# Patient Record
Sex: FEMALE | Race: OTHER | HISPANIC OR LATINO
[De-identification: names, ages, dates, MRNs, and addresses within clinical notes are randomized per-mention and may not be internally consistent; named-entity substitution may affect disease eponyms.]

---

## 2018-05-09 ENCOUNTER — APPOINTMENT (OUTPATIENT)
Dept: HEART AND VASCULAR | Facility: CLINIC | Age: 53
End: 2018-05-09

## 2021-06-02 ENCOUNTER — APPOINTMENT (OUTPATIENT)
Dept: HEART AND VASCULAR | Facility: CLINIC | Age: 56
End: 2021-06-02

## 2021-12-14 ENCOUNTER — APPOINTMENT (OUTPATIENT)
Dept: NEUROSURGERY | Facility: CLINIC | Age: 56
End: 2021-12-14
Payer: MEDICAID

## 2021-12-14 VITALS — HEIGHT: 63 IN | BODY MASS INDEX: 33.66 KG/M2 | WEIGHT: 190 LBS

## 2021-12-14 PROCEDURE — 99204 OFFICE O/P NEW MOD 45 MIN: CPT

## 2022-01-06 ENCOUNTER — NON-APPOINTMENT (OUTPATIENT)
Age: 57
End: 2022-01-06

## 2022-01-14 ENCOUNTER — APPOINTMENT (OUTPATIENT)
Dept: NEUROSURGERY | Facility: HOSPITAL | Age: 57
End: 2022-01-14
Payer: MEDICAID

## 2022-01-14 PROCEDURE — 63685 INS/RPLC SPI NPG/RCVR POCKET: CPT | Mod: RT

## 2022-01-14 PROCEDURE — 63650 IMPLANT NEUROELECTRODES: CPT

## 2022-01-14 PROCEDURE — 63685 INS/RPLC SPI NPG/RCVR POCKET: CPT | Mod: AS,RT

## 2022-01-18 ENCOUNTER — NON-APPOINTMENT (OUTPATIENT)
Age: 57
End: 2022-01-18

## 2022-01-25 ENCOUNTER — APPOINTMENT (OUTPATIENT)
Dept: NEUROSURGERY | Facility: CLINIC | Age: 57
End: 2022-01-25
Payer: MEDICAID

## 2022-01-25 VITALS — WEIGHT: 190 LBS | HEIGHT: 63 IN | BODY MASS INDEX: 33.66 KG/M2

## 2022-01-25 PROCEDURE — 99213 OFFICE O/P EST LOW 20 MIN: CPT

## 2022-02-03 ENCOUNTER — NON-APPOINTMENT (OUTPATIENT)
Age: 57
End: 2022-02-03

## 2022-02-08 ENCOUNTER — APPOINTMENT (OUTPATIENT)
Dept: NEUROSURGERY | Facility: CLINIC | Age: 57
End: 2022-02-08
Payer: MEDICAID

## 2022-02-08 ENCOUNTER — NON-APPOINTMENT (OUTPATIENT)
Age: 57
End: 2022-02-08

## 2022-02-08 VITALS — WEIGHT: 190 LBS | HEIGHT: 63 IN | BODY MASS INDEX: 33.66 KG/M2 | RESPIRATION RATE: 16 BRPM

## 2022-02-08 PROCEDURE — 99213 OFFICE O/P EST LOW 20 MIN: CPT

## 2022-02-22 ENCOUNTER — APPOINTMENT (OUTPATIENT)
Dept: NEUROSURGERY | Facility: CLINIC | Age: 57
End: 2022-02-22
Payer: MEDICAID

## 2022-02-22 DIAGNOSIS — N39.3 STRESS INCONTINENCE (FEMALE) (MALE): ICD-10-CM

## 2022-02-22 PROCEDURE — 99213 OFFICE O/P EST LOW 20 MIN: CPT

## 2022-03-01 ENCOUNTER — APPOINTMENT (OUTPATIENT)
Dept: NEUROSURGERY | Facility: CLINIC | Age: 57
End: 2022-03-01

## 2022-03-01 DIAGNOSIS — M79.89 OTHER SPECIFIED SOFT TISSUE DISORDERS: ICD-10-CM

## 2022-03-22 ENCOUNTER — LABORATORY RESULT (OUTPATIENT)
Age: 57
End: 2022-03-22

## 2022-03-22 ENCOUNTER — APPOINTMENT (OUTPATIENT)
Dept: NEUROSURGERY | Facility: CLINIC | Age: 57
End: 2022-03-22
Payer: MEDICAID

## 2022-03-22 VITALS — BODY MASS INDEX: 33.66 KG/M2 | HEIGHT: 63 IN | WEIGHT: 190 LBS | RESPIRATION RATE: 16 BRPM

## 2022-03-22 PROCEDURE — 99213 OFFICE O/P EST LOW 20 MIN: CPT

## 2022-03-24 LAB — CRP SERPL-MCNC: 14 MG/L

## 2022-03-31 ENCOUNTER — NON-APPOINTMENT (OUTPATIENT)
Age: 57
End: 2022-03-31

## 2022-04-05 ENCOUNTER — LABORATORY RESULT (OUTPATIENT)
Age: 57
End: 2022-04-05

## 2022-04-05 ENCOUNTER — APPOINTMENT (OUTPATIENT)
Dept: NEUROSURGERY | Facility: CLINIC | Age: 57
End: 2022-04-05
Payer: MEDICAID

## 2022-04-05 VITALS — HEIGHT: 63 IN | BODY MASS INDEX: 33.66 KG/M2 | RESPIRATION RATE: 16 BRPM | WEIGHT: 190 LBS

## 2022-04-05 PROCEDURE — 99212 OFFICE O/P EST SF 10 MIN: CPT

## 2022-04-11 LAB — CRP SERPL-MCNC: 25 MG/L

## 2022-04-20 ENCOUNTER — OUTPATIENT (OUTPATIENT)
Dept: OUTPATIENT SERVICES | Facility: HOSPITAL | Age: 57
LOS: 1 days | Discharge: HOME | End: 2022-04-20
Payer: MEDICAID

## 2022-04-20 ENCOUNTER — APPOINTMENT (OUTPATIENT)
Dept: HEART AND VASCULAR | Facility: CLINIC | Age: 57
End: 2022-04-20

## 2022-04-20 VITALS
OXYGEN SATURATION: 100 % | HEART RATE: 69 BPM | HEIGHT: 63 IN | DIASTOLIC BLOOD PRESSURE: 68 MMHG | WEIGHT: 214.95 LBS | SYSTOLIC BLOOD PRESSURE: 122 MMHG | RESPIRATION RATE: 20 BRPM | TEMPERATURE: 97 F

## 2022-04-20 DIAGNOSIS — E89.0 POSTPROCEDURAL HYPOTHYROIDISM: Chronic | ICD-10-CM

## 2022-04-20 DIAGNOSIS — Z98.890 OTHER SPECIFIED POSTPROCEDURAL STATES: Chronic | ICD-10-CM

## 2022-04-20 DIAGNOSIS — Z98.1 ARTHRODESIS STATUS: Chronic | ICD-10-CM

## 2022-04-20 DIAGNOSIS — Z01.818 ENCOUNTER FOR OTHER PREPROCEDURAL EXAMINATION: ICD-10-CM

## 2022-04-20 LAB
ALBUMIN SERPL ELPH-MCNC: 4.8 G/DL — SIGNIFICANT CHANGE UP (ref 3.5–5.2)
ALP SERPL-CCNC: 83 U/L — SIGNIFICANT CHANGE UP (ref 30–115)
ALT FLD-CCNC: 12 U/L — SIGNIFICANT CHANGE UP (ref 0–41)
ANION GAP SERPL CALC-SCNC: 15 MMOL/L — HIGH (ref 7–14)
APTT BLD: 40.8 SEC — HIGH (ref 27–39.2)
AST SERPL-CCNC: 17 U/L — SIGNIFICANT CHANGE UP (ref 0–41)
BASOPHILS # BLD AUTO: 0.03 K/UL — SIGNIFICANT CHANGE UP (ref 0–0.2)
BASOPHILS NFR BLD AUTO: 0.4 % — SIGNIFICANT CHANGE UP (ref 0–1)
BILIRUB SERPL-MCNC: 0.4 MG/DL — SIGNIFICANT CHANGE UP (ref 0.2–1.2)
BUN SERPL-MCNC: 15 MG/DL — SIGNIFICANT CHANGE UP (ref 10–20)
CALCIUM SERPL-MCNC: 8.7 MG/DL — SIGNIFICANT CHANGE UP (ref 8.5–10.1)
CHLORIDE SERPL-SCNC: 96 MMOL/L — LOW (ref 98–110)
CO2 SERPL-SCNC: 27 MMOL/L — SIGNIFICANT CHANGE UP (ref 17–32)
CREAT SERPL-MCNC: 1 MG/DL — SIGNIFICANT CHANGE UP (ref 0.7–1.5)
EGFR: 67 ML/MIN/1.73M2 — SIGNIFICANT CHANGE UP
EOSINOPHIL # BLD AUTO: 0.09 K/UL — SIGNIFICANT CHANGE UP (ref 0–0.7)
EOSINOPHIL NFR BLD AUTO: 1.3 % — SIGNIFICANT CHANGE UP (ref 0–8)
GLUCOSE SERPL-MCNC: 77 MG/DL — SIGNIFICANT CHANGE UP (ref 70–99)
HCT VFR BLD CALC: 35 % — LOW (ref 37–47)
HGB BLD-MCNC: 11.3 G/DL — LOW (ref 12–16)
IMM GRANULOCYTES NFR BLD AUTO: 0.4 % — HIGH (ref 0.1–0.3)
INR BLD: 1.1 RATIO — SIGNIFICANT CHANGE UP (ref 0.65–1.3)
LYMPHOCYTES # BLD AUTO: 1.48 K/UL — SIGNIFICANT CHANGE UP (ref 1.2–3.4)
LYMPHOCYTES # BLD AUTO: 22 % — SIGNIFICANT CHANGE UP (ref 20.5–51.1)
MCHC RBC-ENTMCNC: 30.1 PG — SIGNIFICANT CHANGE UP (ref 27–31)
MCHC RBC-ENTMCNC: 32.3 G/DL — SIGNIFICANT CHANGE UP (ref 32–37)
MCV RBC AUTO: 93.1 FL — SIGNIFICANT CHANGE UP (ref 81–99)
MONOCYTES # BLD AUTO: 0.54 K/UL — SIGNIFICANT CHANGE UP (ref 0.1–0.6)
MONOCYTES NFR BLD AUTO: 8 % — SIGNIFICANT CHANGE UP (ref 1.7–9.3)
NEUTROPHILS # BLD AUTO: 4.55 K/UL — SIGNIFICANT CHANGE UP (ref 1.4–6.5)
NEUTROPHILS NFR BLD AUTO: 67.9 % — SIGNIFICANT CHANGE UP (ref 42.2–75.2)
NRBC # BLD: 0 /100 WBCS — SIGNIFICANT CHANGE UP (ref 0–0)
PLATELET # BLD AUTO: 389 K/UL — SIGNIFICANT CHANGE UP (ref 130–400)
POTASSIUM SERPL-MCNC: 4.5 MMOL/L — SIGNIFICANT CHANGE UP (ref 3.5–5)
POTASSIUM SERPL-SCNC: 4.5 MMOL/L — SIGNIFICANT CHANGE UP (ref 3.5–5)
PROT SERPL-MCNC: 7.4 G/DL — SIGNIFICANT CHANGE UP (ref 6–8)
PROTHROM AB SERPL-ACNC: 12.6 SEC — SIGNIFICANT CHANGE UP (ref 9.95–12.87)
RBC # BLD: 3.76 M/UL — LOW (ref 4.2–5.4)
RBC # FLD: 13.4 % — SIGNIFICANT CHANGE UP (ref 11.5–14.5)
SODIUM SERPL-SCNC: 138 MMOL/L — SIGNIFICANT CHANGE UP (ref 135–146)
WBC # BLD: 6.72 K/UL — SIGNIFICANT CHANGE UP (ref 4.8–10.8)
WBC # FLD AUTO: 6.72 K/UL — SIGNIFICANT CHANGE UP (ref 4.8–10.8)

## 2022-04-20 PROCEDURE — 93010 ELECTROCARDIOGRAM REPORT: CPT

## 2022-04-20 RX ORDER — FLUTICASONE PROPIONATE AND SALMETEROL 50; 250 UG/1; UG/1
1 POWDER ORAL; RESPIRATORY (INHALATION)
Qty: 0 | Refills: 0 | DISCHARGE

## 2022-04-20 RX ORDER — VENLAFAXINE HCL 75 MG
1 CAPSULE, EXT RELEASE 24 HR ORAL
Qty: 0 | Refills: 0 | DISCHARGE

## 2022-04-20 NOTE — H&P PST ADULT - HISTORY OF PRESENT ILLNESS
57 yo f here for past. pt sched for MRI L&T spine with & w/o sedation     Pt reports no cardiopulmonary issues denies sob/diez/cp/palpitations. Pt states no recent infections no fever no cough no uti uri. Stated exercise tolerance is   2  flights no changes. Dwight screen revd.  pt verbalized and understanding of instructions  given and all concerns and questions asked and answered.  Pt denies any s/s covid 19 and reports no contact with known positive people. Pt has appointment for repeat covid testing pre op and instructed to continue to self monitor and report any concerns to MD. Pt will continue to practice self isolation and  exposure control measures pre op  Anesthesia Alert  NO--Difficult Airway  NO--History of neck surgery or radiation  NO--Limited ROM of neck  NO--History of Malignant hyperthermia  NO--No personal or family history of Pseudocholinesterase deficiency.  NO--Prior Anesthesia Complication  NO--Latex Allergy  NO--Loose teeth  NO--History of Rheumatoid Arthritis  NO--DWIGHT  NO--Other_____  written and verbal instructions with teach back on chlorhexidine shampoo provided,  pt verbalized understanding with returned demonstration  PT DENIES ANY RASHES, ABRASION, OR OPEN WOUNDS OR CUTS  denies travel outside the [place] in the past 30 days

## 2022-04-20 NOTE — H&P PST ADULT - NSICDXPASTSURGICALHX_GEN_ALL_CORE_FT
PAST SURGICAL HISTORY:  H/O laminectomy     S/P discectomy     S/P spinal fusion     S/P total thyroidectomy

## 2022-04-20 NOTE — H&P PST ADULT - NSICDXPASTMEDICALHX_GEN_ALL_CORE_FT
PAST MEDICAL HISTORY:  Anxiety     Anxiety and depression     Asthma     Chronic low back pain with bilateral sciatica     Deviated septum     Diabetes mellitus     H/O carpal tunnel repair     H/O radiculopathy     High cholesterol     History of IBS     HTN (hypertension)     Hypocalcemia     Hypothyroid     Migraine     Spinal cord stimulator status     Stress incontinence     Thyroid cancer

## 2022-04-22 DIAGNOSIS — M54.50 LOW BACK PAIN, UNSPECIFIED: ICD-10-CM

## 2022-04-25 ENCOUNTER — LABORATORY RESULT (OUTPATIENT)
Age: 57
End: 2022-04-25

## 2022-04-27 ENCOUNTER — RESULT REVIEW (OUTPATIENT)
Age: 57
End: 2022-04-27

## 2022-04-27 ENCOUNTER — OUTPATIENT (OUTPATIENT)
Dept: OUTPATIENT SERVICES | Facility: HOSPITAL | Age: 57
LOS: 1 days | Discharge: HOME | End: 2022-04-27
Payer: MEDICAID

## 2022-04-27 DIAGNOSIS — Z98.890 OTHER SPECIFIED POSTPROCEDURAL STATES: Chronic | ICD-10-CM

## 2022-04-27 DIAGNOSIS — M54.42 LUMBAGO WITH SCIATICA, LEFT SIDE: ICD-10-CM

## 2022-04-27 DIAGNOSIS — E89.0 POSTPROCEDURAL HYPOTHYROIDISM: Chronic | ICD-10-CM

## 2022-04-27 DIAGNOSIS — Z98.1 ARTHRODESIS STATUS: Chronic | ICD-10-CM

## 2022-04-27 DIAGNOSIS — Z96.89 PRESENCE OF OTHER SPECIFIED FUNCTIONAL IMPLANTS: ICD-10-CM

## 2022-04-27 LAB — GLUCOSE BLDC GLUCOMTR-MCNC: 87 MG/DL — SIGNIFICANT CHANGE UP (ref 70–99)

## 2022-04-27 PROCEDURE — 72157 MRI CHEST SPINE W/O & W/DYE: CPT | Mod: 26

## 2022-04-27 PROCEDURE — 72158 MRI LUMBAR SPINE W/O & W/DYE: CPT | Mod: 26

## 2022-04-29 PROBLEM — J34.2 DEVIATED NASAL SEPTUM: Chronic | Status: ACTIVE | Noted: 2022-04-20

## 2022-04-29 PROBLEM — E03.9 HYPOTHYROIDISM, UNSPECIFIED: Chronic | Status: ACTIVE | Noted: 2022-04-20

## 2022-04-29 PROBLEM — E78.00 PURE HYPERCHOLESTEROLEMIA, UNSPECIFIED: Chronic | Status: ACTIVE | Noted: 2022-04-20

## 2022-04-29 PROBLEM — C73 MALIGNANT NEOPLASM OF THYROID GLAND: Chronic | Status: ACTIVE | Noted: 2022-04-20

## 2022-04-29 PROBLEM — Z96.89 PRESENCE OF OTHER SPECIFIED FUNCTIONAL IMPLANTS: Chronic | Status: ACTIVE | Noted: 2022-04-20

## 2022-04-29 PROBLEM — Z86.69 PERSONAL HISTORY OF OTHER DISEASES OF THE NERVOUS SYSTEM AND SENSE ORGANS: Chronic | Status: ACTIVE | Noted: 2022-04-20

## 2022-04-29 PROBLEM — N39.3 STRESS INCONTINENCE (FEMALE) (MALE): Chronic | Status: ACTIVE | Noted: 2022-04-20

## 2022-04-29 PROBLEM — Z98.890 OTHER SPECIFIED POSTPROCEDURAL STATES: Chronic | Status: ACTIVE | Noted: 2022-04-20

## 2022-04-29 PROBLEM — M54.42 LUMBAGO WITH SCIATICA, LEFT SIDE: Chronic | Status: ACTIVE | Noted: 2022-04-20

## 2022-04-29 PROBLEM — F41.9 ANXIETY DISORDER, UNSPECIFIED: Chronic | Status: ACTIVE | Noted: 2022-04-20

## 2022-04-29 PROBLEM — J45.909 UNSPECIFIED ASTHMA, UNCOMPLICATED: Chronic | Status: ACTIVE | Noted: 2022-04-20

## 2022-04-29 PROBLEM — I10 ESSENTIAL (PRIMARY) HYPERTENSION: Chronic | Status: ACTIVE | Noted: 2022-04-20

## 2022-04-29 PROBLEM — E83.51 HYPOCALCEMIA: Chronic | Status: ACTIVE | Noted: 2022-04-20

## 2022-04-29 PROBLEM — Z87.19 PERSONAL HISTORY OF OTHER DISEASES OF THE DIGESTIVE SYSTEM: Chronic | Status: ACTIVE | Noted: 2022-04-20

## 2022-04-29 PROBLEM — G43.909 MIGRAINE, UNSPECIFIED, NOT INTRACTABLE, WITHOUT STATUS MIGRAINOSUS: Chronic | Status: ACTIVE | Noted: 2022-04-20

## 2022-05-03 ENCOUNTER — APPOINTMENT (OUTPATIENT)
Dept: NEUROSURGERY | Facility: CLINIC | Age: 57
End: 2022-05-03
Payer: MEDICAID

## 2022-05-03 DIAGNOSIS — R93.7 ABNORMAL FINDINGS ON DIAGNOSTIC IMAGING OF OTHER PARTS OF MUSCULOSKELETAL SYSTEM: ICD-10-CM

## 2022-05-03 PROCEDURE — 99441: CPT

## 2022-05-05 PROBLEM — R93.7 ABNORMAL MRI, SPINE: Status: ACTIVE | Noted: 2022-05-03

## 2022-05-09 NOTE — REASON FOR VISIT
[Home] : at home, [unfilled] , at the time of the visit. [Medical Office: ([facility])___] : at the medical office located in  [Verbal consent obtained from patient] : the patient, [unfilled] [Follow-Up: _____] : a [unfilled] follow-up visit [FreeTextEntry1] : 55 yo female following up to review recent MRIs with contrast. \par \par Discussed results of MRI thoracic and lumbar and informed patient that there is no evidence of infection present, and the SCS remains in appropriate location. Also discussed incidental findings in the MRI reports\par \par IMAGING: MRI Thoracic wwo dated 4/27/22 demonstrates cystic lesion within the RIGHT hepatic lobe measuring 1.8 cm. There is also a 1.4 cm T1 and T2 hypointense, STIR faintly hypertense lesion within the T7 vertebral body, and not present on the 2009 MRI. \par \par Plan: \par   - Patient may return to work in 2 weeks, on 5/16/2022.\par      A new work note will be sent to patient via email. \par   - Results of MRI will be emailed to patient, and advised to follow-up with PMD with regard to her liver findings.\par   - Bone Scan order emailed to patient, does not require sedation and may be done in the meantime. \par   - FU after bone scan\par \par \par \par

## 2022-05-09 NOTE — REVIEW OF SYSTEMS
[As Noted in HPI] : as noted in HPI [Fever] : no fever [Chills] : no chills [Shortness Of Breath] : no shortness of breath [Wheezing] : no wheezing [Vomiting] : no vomiting

## 2022-05-14 ENCOUNTER — APPOINTMENT (OUTPATIENT)
Dept: NUCLEAR MEDICINE | Facility: HOSPITAL | Age: 57
End: 2022-05-14

## 2022-05-17 ENCOUNTER — NON-APPOINTMENT (OUTPATIENT)
Age: 57
End: 2022-05-17

## 2022-05-17 ENCOUNTER — APPOINTMENT (OUTPATIENT)
Dept: NEUROSURGERY | Facility: CLINIC | Age: 57
End: 2022-05-17

## 2022-05-26 NOTE — H&P PST ADULT - NS MD HP HEP C STATUS
Pt called office, notified pt Dr. Theodore Ryder did say patient could try medication, but if injection site reaction occurs, will need to discontinue immediately. Explained to Pt signs of site reaction or more severe signs/symptoms and instructed to keep our office updated if she experiences it. Pt voiced understanding and appreciative. Negative

## 2022-05-31 ENCOUNTER — RESULT REVIEW (OUTPATIENT)
Age: 57
End: 2022-05-31

## 2022-06-02 ENCOUNTER — APPOINTMENT (OUTPATIENT)
Dept: NUCLEAR MEDICINE | Facility: HOSPITAL | Age: 57
End: 2022-06-02

## 2022-06-02 ENCOUNTER — OUTPATIENT (OUTPATIENT)
Dept: OUTPATIENT SERVICES | Facility: HOSPITAL | Age: 57
LOS: 1 days | End: 2022-06-02
Payer: MEDICAID

## 2022-06-02 DIAGNOSIS — E89.0 POSTPROCEDURAL HYPOTHYROIDISM: Chronic | ICD-10-CM

## 2022-06-02 DIAGNOSIS — Z98.890 OTHER SPECIFIED POSTPROCEDURAL STATES: Chronic | ICD-10-CM

## 2022-06-02 DIAGNOSIS — Z98.1 ARTHRODESIS STATUS: Chronic | ICD-10-CM

## 2022-06-02 PROCEDURE — 78830 RP LOCLZJ TUM SPECT W/CT 1: CPT | Mod: 26

## 2022-06-02 PROCEDURE — 78830 RP LOCLZJ TUM SPECT W/CT 1: CPT

## 2022-06-03 ENCOUNTER — NON-APPOINTMENT (OUTPATIENT)
Age: 57
End: 2022-06-03

## 2022-06-14 ENCOUNTER — APPOINTMENT (OUTPATIENT)
Dept: NEUROSURGERY | Facility: CLINIC | Age: 57
End: 2022-06-14

## 2022-06-14 DIAGNOSIS — M54.6 PAIN IN THORACIC SPINE: ICD-10-CM

## 2022-06-14 PROCEDURE — 99214 OFFICE O/P EST MOD 30 MIN: CPT

## 2022-06-15 ENCOUNTER — NON-APPOINTMENT (OUTPATIENT)
Age: 57
End: 2022-06-15

## 2022-06-16 ENCOUNTER — RESULT REVIEW (OUTPATIENT)
Age: 57
End: 2022-06-16

## 2022-06-16 ENCOUNTER — OUTPATIENT (OUTPATIENT)
Dept: OUTPATIENT SERVICES | Facility: HOSPITAL | Age: 57
LOS: 1 days | End: 2022-06-16
Payer: MEDICAID

## 2022-06-16 ENCOUNTER — NON-APPOINTMENT (OUTPATIENT)
Age: 57
End: 2022-06-16

## 2022-06-16 ENCOUNTER — APPOINTMENT (OUTPATIENT)
Dept: HEART AND VASCULAR | Facility: CLINIC | Age: 57
End: 2022-06-16
Payer: MEDICAID

## 2022-06-16 ENCOUNTER — LABORATORY RESULT (OUTPATIENT)
Age: 57
End: 2022-06-16

## 2022-06-16 ENCOUNTER — APPOINTMENT (OUTPATIENT)
Dept: ORTHOPEDIC SURGERY | Facility: CLINIC | Age: 57
End: 2022-06-16
Payer: MEDICAID

## 2022-06-16 VITALS — BODY MASS INDEX: 37.56 KG/M2 | WEIGHT: 212 LBS | HEIGHT: 63 IN

## 2022-06-16 VITALS
HEIGHT: 63 IN | HEART RATE: 73 BPM | TEMPERATURE: 97.8 F | WEIGHT: 212 LBS | BODY MASS INDEX: 37.56 KG/M2 | OXYGEN SATURATION: 98 % | DIASTOLIC BLOOD PRESSURE: 80 MMHG | SYSTOLIC BLOOD PRESSURE: 122 MMHG

## 2022-06-16 DIAGNOSIS — E78.5 HYPERLIPIDEMIA, UNSPECIFIED: ICD-10-CM

## 2022-06-16 DIAGNOSIS — E89.0 POSTPROCEDURAL HYPOTHYROIDISM: Chronic | ICD-10-CM

## 2022-06-16 DIAGNOSIS — Z98.1 ARTHRODESIS STATUS: Chronic | ICD-10-CM

## 2022-06-16 DIAGNOSIS — Z98.890 OTHER SPECIFIED POSTPROCEDURAL STATES: Chronic | ICD-10-CM

## 2022-06-16 DIAGNOSIS — R00.2 PALPITATIONS: ICD-10-CM

## 2022-06-16 DIAGNOSIS — M25.461 EFFUSION, RIGHT KNEE: ICD-10-CM

## 2022-06-16 PROCEDURE — 99214 OFFICE O/P EST MOD 30 MIN: CPT | Mod: 25

## 2022-06-16 PROCEDURE — 99204 OFFICE O/P NEW MOD 45 MIN: CPT | Mod: 25

## 2022-06-16 PROCEDURE — 20611 DRAIN/INJ JOINT/BURSA W/US: CPT

## 2022-06-16 PROCEDURE — 73564 X-RAY EXAM KNEE 4 OR MORE: CPT

## 2022-06-16 PROCEDURE — 93000 ELECTROCARDIOGRAM COMPLETE: CPT

## 2022-06-16 PROCEDURE — 73564 X-RAY EXAM KNEE 4 OR MORE: CPT | Mod: 26,LT,RT

## 2022-06-16 RX ORDER — OMEPRAZOLE 40 MG/1
40 CAPSULE, DELAYED RELEASE ORAL
Qty: 30 | Refills: 0 | Status: ACTIVE | COMMUNITY
Start: 2022-05-24

## 2022-06-16 RX ORDER — ALBUTEROL SULFATE 90 UG/1
108 (90 BASE) INHALANT RESPIRATORY (INHALATION)
Refills: 0 | Status: ACTIVE | COMMUNITY
Start: 2022-06-16

## 2022-06-16 RX ORDER — GABAPENTIN 300 MG/1
300 CAPSULE ORAL
Qty: 30 | Refills: 0 | Status: ACTIVE | COMMUNITY
Start: 2022-05-24

## 2022-06-16 RX ORDER — METOPROLOL SUCCINATE 25 MG/1
25 TABLET, EXTENDED RELEASE ORAL DAILY
Qty: 90 | Refills: 3 | Status: ACTIVE | COMMUNITY
Start: 2022-06-16 | End: 1900-01-01

## 2022-06-16 RX ORDER — ONDANSETRON 8 MG/1
8 TABLET ORAL
Qty: 12 | Refills: 0 | Status: ACTIVE | COMMUNITY
Start: 2022-05-31

## 2022-06-16 RX ORDER — KETOCONAZOLE 20 MG/G
2 CREAM TOPICAL
Qty: 60 | Refills: 0 | Status: ACTIVE | COMMUNITY
Start: 2022-06-12

## 2022-06-16 RX ORDER — MECLIZINE HYDROCHLORIDE 25 MG/1
25 TABLET ORAL
Qty: 20 | Refills: 0 | Status: ACTIVE | COMMUNITY
Start: 2022-05-24

## 2022-06-16 RX ORDER — ALBUTEROL SULFATE 90 UG/1
108 (90 BASE) INHALANT RESPIRATORY (INHALATION)
Qty: 18 | Refills: 0 | Status: ACTIVE | COMMUNITY
Start: 2022-05-25

## 2022-06-16 NOTE — ASSESSMENT
[FreeTextEntry1] : Hyperlipidemia: \par - Continue pravastatin 40mg daily\par - Continue Ezetimibe 10mg daily\par - Discussed therapeutic lifestyle changes to promote improved lipid metabolism (low fat, low cholesterol heart healthy diet, striving for optimal weight control, and aerobic exercises as tolerated)\par - Will consider increasing dose if CAD depending on study results\par - Obtain labs next visit\par \par Palpitations: +SOB, + LE swelling vs thyroid dz\par - Obtain echo to evaluate LVF and sructural dz\par - Restart Toprol XL 25mg daily\par \par Obesity (BMI 37.5):\par - We had an extensive discussion about the therapeutic lifestyle changes to promote cardiovascular fitness and achieving goal weight.\par \par Hypothyroid: \par - Synthroid 300mcg daily\par \par Anxiety: \par - Continue Abilify 5 mg daily \par - Continue Clonazepam 1mg\par

## 2022-06-16 NOTE — HISTORY OF PRESENT ILLNESS
[FreeTextEntry1] : Vibha Gamino is a 57 yo woman with a pmhx of hypothyroidism, anxiety, diet controlled DM, HLD, and palpitations. She has spine issues dating back to 2015 and s/p numerous surgeries. January 2022 she had SCS placement with good results but she fell approx. 2 weeks post procedure. Since then she states she has swelling and pain to the RLE. She also remarks on having right Baker's cyst. She is here today with c/o intermittent palpitations, SOB and LE swelling. She has gained weight since April (BMI 37.5) and remarks on being sedentary. She started "medical medium diet" to help with weight loss and sugar intake.\par \par

## 2022-06-16 NOTE — REVIEW OF SYSTEMS
[Negative] : Heme/Lymph [SOB] : shortness of breath [Lower Ext Edema] : lower extremity edema [Joint Pain] : joint pain [Joint Swelling] : joint swelling [Palpitations] : palpitations [Weight Loss (___ Lbs)] : no recent weight loss

## 2022-06-16 NOTE — REASON FOR VISIT
[Other: ____] : [unfilled] [FreeTextEntry1] : EKG:\par 06/16/2022: Sinus rhythm, T wave abnormality V3-V6 (unchanged from prior)\par 04/22/2022: Sinus Rhythm, TW abnormality, lateral ischemia\par -------------------------------------------------\par ECHO:\par 02/18/2016: EF 55%. Nml LV size and function. Trace TR and trace AI.\par -------------------------------------------------\par Stress: \par 03/16/2016:  EF 60%. Exc 3:31 min, 5 METS. No ischemia,no WMA, exercised-induced PH (PAS 45mmHg).\par -------------------------------------------------\par Cath: \par 2012 ([facility]): normal diagnostic LHC\par ------------------------------------------------\par Carotids:\par 02/18/2016: Minimal diffuse plaque noted in JENNIFER.\par -------------------------------------------------\par MIRIAM:\par 2/18/16: normal\par -------------------------------------------------\par Holter:\par 2015: PVC's

## 2022-06-16 NOTE — PHYSICAL EXAM
[de-identified] : General: Well-nourished, well-developed, alert, and in no acute distress.\par Head: Normocephalic.\par Eyes: Pupils equal round reactive to light and accommodation, extraocular muscles intact, normal sclera.\par Nose: No nasal discharge.\par Cardiac: Regular rate. Extremities are warm and well perfused. Distal pulses are symmetric bilaterally.\par Respiratory: No labored breathing.\par Extremities: Sensation is intact distally bilaterally.  Distal pulses are symmetric bilaterally\par Lymphatic: No regional lymphadenopathy, no lymphedema\par Neurologic: No focal deficits\par Skin: Normal skin color, texture, and turgor\par Psychiatric: Normal affect\par MSK: as noted above/below\par \par \par \par RIGHT KNEE:\par \par Inspection: no bruising, erythema\par Joint Effusion:MODERATE\par ROM: Knee Flexion 30 , Knee Extension +5\par Palpation:MEDIAL JOINT LINE PAIN, PERIPATELLAR PAIN, No pain at patellar tendon, MFC/LFC, Medial/Lateral Tibial Plateau\par Leg Length Discrepancy:no\par Patella: no apprehension, +COMPRESSION\par Distal Pulses: normal\par Lower Extremity Strength:4+/5\par Lower Extremity Reflexes:normal, 2+\par Lower Extremity Sensation: normal\par \par Special Tests:\par Tanja: POSITIVE MEDIALLY\par Varus/Valgus:Negative, no instability\par \par LEFT KNEE:\par \par Inspection: no bruising, swelling, erythema\par Joint Effusion:no \par ROM: Knee Flexion 120 , Knee Extension 0\par Palpation: No pain at patellar tendon, MFC/LFC, Medial/Lateral Tibial Plateau\par Leg Length Discrepancy:no\par Patella: no apprehension\par Distal Pulses: normal\par Lower Extremity Strength:normal, 5/5 \par Lower Extremity Reflexes:normal, 2+\par Lower Extremity Sensation: normal\par \par  [de-identified] : Xray Bilateral Knees - Multiple views were reviewed with the patient in detail.  There is no acute fracture or dislocation.  There is right knee effusion.  Trace bilateral patellofemoral space narrowing/degenerative change. We will await formal radiology reading.\par \par

## 2022-06-16 NOTE — DISCUSSION/SUMMARY
[Medication Risks Reviewed] : Medication risks reviewed [de-identified] : The patient is a 56 year old woman with history of chronic back pain s/p spinal cord stimulator presenting with atraumatic, severe right knee pain.  Cannot rule out displaced meniscus tear, insufficiency fracture.\par \par Imaging/Diagnostics/Medical Records were interpreted and/or reviewed and results were reviewed with the patient in detail.  All questions were answered appropriately.\par \par MRI of the right knee ordered today.\par \par Patient has exhausted conservative treatment including relative rest, activity modification, at least 8 weeks of physician-directed home exercise program/physical therapy, pharmacologics, observation.  IT IS MEDICALLY NECESSARY TO APPROVE THE FOLLOWING DIAGNOSTIC IMAGING.\par \par Patient was prescribed a short course of Etodolac .Appropriate use of medication was reviewed with the patient in detail. Risks, benefits, and adverse effects medication were discussed.\par \par Ok to continue brace for offloading.\par \par After informed consent, and explanation of risks, benefits, alternatives, adverse effects of injection, which includes but is not limited to infection, bleeding, allergic reaction, swelling, soft tissue weakening/tendon rupture, neurovascular injury, injection site complication, fat atrophy, skin depigmentation, failure to improve symptoms, the patient would like to proceed with the procedure - RIGHT KNEE ULTRASOUND-GUIDED ARTHROCENTESIS. See procedure note above. Patient tolerated the procedure well. The patient was provided with postinjection instructions.\par \par Follow-up synovial fluid analysis.\par \par Follow-up after MRI.\par \par I explained to the patient that I will be leaving at the end of July 2022.  With established patients, I will continue to provide care during that time.  I explained that the Orthopedic team will be sending out letters regarding my departure and we have provided referrals in-office as requested.\par \par >45 minutes of time was spent on total encounter.  >50% of the visit was spent on face-to-face counseling/coordination of care and medical-decision making for this patient.\par \par \par

## 2022-06-16 NOTE — DISCUSSION/SUMMARY
[FreeTextEntry1] : All relevant risks and benefits discussed. Patient verbalizes understanding and agreement with plan.\par \par

## 2022-06-16 NOTE — PROCEDURE
[de-identified] : Ultrasound-Guided RIGHT Knee Arthrocentesis\par \par Indication for U/S Guidance: Ensure placement within the tibiofemoral joint for diagnostic purposes, while avoiding neurovascular structures\par \par Indication for Injection: KNEE EFFUSION\par \par A discussion was had with the patient regarding this procedure and all questions were answered. All risks, benefits and alternatives were discussed. These included but were not limited to bleeding, infection, injection site reaction/complication and allergic reaction. A timeout was done to ensure correct side and pt agreed to the procedure. Betadine was used to sterilize and prep the area, and alcohol was used to clean the skin in the anterior aspect of the knee joint. The suprapatellar space was visualized utilizing the Sonosite, linear transducer. The joint was visualized in the short axis and an in-plane approach was used for the injection. Ultrasound guidance was utilized to ensure accuracy of the intra-articular aspiration, and avoid the neurovascular structures. A 25-gauge 1.5" needle was used to inject 2cc of 1% lidocaine without epi into the SubQ.  This was followed by aspiration with an 18-gauge 1.5" needle with aspiration of 30cc of nonpurulent, yellow synovial fluid.. A sterile bandage was then applied. The patient tolerated the procedure well and there were no complications.\par

## 2022-06-16 NOTE — HISTORY OF PRESENT ILLNESS
[de-identified] : The patient is a 56 year old woman with history of chronic back pain s/p spinal cord stimulator presenting with right knee pain.\par \par She presents with a several week history of subacute, atraumatic right medial knee pain, stiffness, and swelling.  She recalls a mechanical fall in January 2022.  Pain has been progressive.  She first had posteromedial knee pain.  She was sent for venous duplex by her PMD who found baker's cyst.  She then had episode of baker's cyst rupture.  For the last few weeks, she has been limping and using a walking cane as an assist device.  Unable to bend to knee past 30 degrees.  No previous knee injury.\par \par Pain is severe, described as sharp, improved with rest, worse with walking.

## 2022-06-17 LAB
B PERT IGG+IGM PNL SER: ABNORMAL
COLOR FLD: YELLOW
EOSINOPHIL # FLD MANUAL: 0 %
FLUID INTAKE SUBSTANCE CLASS: NORMAL
LYMPHOCYTES # FLD MANUAL: 28 %
MESOTHL CELL NFR FLD: 0 %
MONOS+MACROS NFR FLD MANUAL: 44 %
NEUTS SEG # FLD MANUAL: 28 %
NRBC # FLD: 0 %
RBC # FLD MANUAL: 1000 /UL
SYCRY CLARITY: ABNORMAL
SYCRY COLOR: YELLOW
SYCRY ID: NORMAL
SYCRY TUBE: NORMAL
TOTAL CELLS COUNTED FLD: 116 /UL
TUBE TYPE: NORMAL
UNIDENT CELLS NFR FLD MANUAL: 0 %
VARIANT LYMPHS # FLD MANUAL: 0 %

## 2022-06-21 ENCOUNTER — APPOINTMENT (OUTPATIENT)
Dept: NEUROSURGERY | Facility: CLINIC | Age: 57
End: 2022-06-21
Payer: MEDICAID

## 2022-06-21 VITALS
BODY MASS INDEX: 38.09 KG/M2 | TEMPERATURE: 96.9 F | HEART RATE: 59 BPM | SYSTOLIC BLOOD PRESSURE: 122 MMHG | DIASTOLIC BLOOD PRESSURE: 72 MMHG | RESPIRATION RATE: 16 BRPM | OXYGEN SATURATION: 98 % | WEIGHT: 215 LBS | HEIGHT: 63 IN

## 2022-06-21 PROCEDURE — 99212 OFFICE O/P EST SF 10 MIN: CPT

## 2022-06-30 DIAGNOSIS — M84.451A PATHOLOGICAL FRACTURE, RIGHT FEMUR, INITIAL ENCOUNTER FOR FRACTURE: ICD-10-CM

## 2022-06-30 RX ORDER — ETODOLAC 400 MG/1
400 TABLET, FILM COATED ORAL TWICE DAILY
Qty: 60 | Refills: 1 | Status: DISCONTINUED | COMMUNITY
Start: 2022-06-16 | End: 2022-06-30

## 2022-07-01 ENCOUNTER — APPOINTMENT (OUTPATIENT)
Dept: HEART AND VASCULAR | Facility: CLINIC | Age: 57
End: 2022-07-01

## 2022-07-07 ENCOUNTER — APPOINTMENT (OUTPATIENT)
Dept: ORTHOPEDIC SURGERY | Facility: CLINIC | Age: 57
End: 2022-07-07

## 2022-07-13 ENCOUNTER — APPOINTMENT (OUTPATIENT)
Dept: ORTHOPEDIC SURGERY | Facility: CLINIC | Age: 57
End: 2022-07-13

## 2022-07-13 DIAGNOSIS — D16.21 BENIGN NEOPLASM OF LONG BONES OF RIGHT LOWER LIMB: ICD-10-CM

## 2022-07-13 PROCEDURE — 99212 OFFICE O/P EST SF 10 MIN: CPT | Mod: 95

## 2022-07-13 RX ORDER — CELECOXIB 100 MG/1
100 CAPSULE ORAL
Qty: 60 | Refills: 1 | Status: DISCONTINUED | COMMUNITY
Start: 2022-06-30 | End: 2022-07-13

## 2022-07-13 NOTE — DISCUSSION/SUMMARY
[Medication Risks Reviewed] : Medication risks reviewed [de-identified] : The patient is a 56 year old woman with history of chronic back pain s/p spinal cord stimulator presenting with atraumatic, severe right knee pain secondary to medial meniscus root tear with medial femoral condyle insufficiency fracture.\par \par She also has incidental right distal femoral enchondroma.  Will need serial imaging ~3-6 months.\par \par Imaging/Diagnostics/Medical Records were interpreted and/or reviewed and results were reviewed with the patient in detail.  All questions were answered appropriately.\par \par Continue medial  brace.\par \par Patient was prescribed a short course of Etodolac.Appropriate use of medication was reviewed with the patient in detail. Risks, benefits, and adverse effects medication were discussed.\par \par Agree with consultation with Endocrinology for optimization of treatment for osteopenia.\par \par Ca-Vitamin D3 supplementation.\par \par Patient understands that they may require surgery in the future.\par \par Discussed potential PT, HA injections in the future pending progress.\par \par Follow-up in-office next week for re-eval.  Will need continued follow-up with my partners thereafter.\par \par I explained to the patient that I will be leaving at the end of July 2022.  With established patients, I will continue to provide care during that time.  I explained that the Orthopedic team will be sending out letters regarding my departure and we have provided referrals in-office as requested.\par \par ------------------------------------------------------------------------------------------------------------------\par Patient appreciates and agrees with current plan.\par \par The patient's diagnosis above was evaluated by me, personally.  Diagnostic Testing and treatment options were discussed with the patient in detail.  The risks/benefits/potential complications of diagnostic testing/treatments were described in detail.  \par \par This note was generated using a mixture of manual typing and dragon medical dictation software.  A reasonable effort has been made for proofreading its contents, but typos may still remain.  If there are any questions or points of clarification needed please notify my office.\par \par I explained to the patient that I will be leaving at the end of July 2022.  With established patients, I will continue to provide care during that time.  I explained that the Orthopedic team will be sending out letters regarding my departure and we have provided referrals in-office as requested.\par >15 minutes of time was spent in total for the encounter.  >50% of the time spent was on face-to-face counseling/coordination of care and medical-decision making for this patient.\par \par \par \par

## 2022-07-13 NOTE — PHYSICAL EXAM
[de-identified] : Constitutional:\par \par No acute distress, awake, alert, oriented\par \par Respiratory:\par Comfortable, no labored breathing\par \par Neuro/Psych:\par General: Awake, Alert, Oriented x3\par Mood/Affect: Normal\par \par Skin:\par No rash, deformity, erythema, ecchymosis\par \par \par \par  [de-identified] : MRI of the right knee:\par 1. High grade partial tear of the posterior root of the medial meniscus with mild medial extrusion of the body segment.\par 2. Subchondral edema at the weightbearing surface of the medial femoral condyle with associated marrow edema\par 3. Medial collateral ligament sprain\par 4. Mild to moderate degenerative cartilage loss in the medial and patellofemoral compartments\par 5. Moderate size joint effusion and small ruptured popliteal cyst\par 6. 1.4 x 1.1cm enchondroma in the distal femoral diaphysis

## 2022-07-13 NOTE — HISTORY OF PRESENT ILLNESS
[Home] : at home, [unfilled] , at the time of the visit. [Medical Office: ([facility])___] : at the medical office located in  [Verbal consent obtained from patient] : the patient, [unfilled] [de-identified] : The patient is a 56 year old woman with history of chronic back pain s/p spinal cord stimulator presenting with right knee pain - via telehealth.\par \par She was last seen ~ 4 weeks ago for a several week history of subacute, atraumatic right medial knee pain, stiffness, and swelling.  She recalls a mechanical fall in January 2022.  Pain has been progressive.  She first had posteromedial knee pain.  She was sent for venous duplex by her PMD who found baker's cyst.  She then had episode of baker's cyst rupture.  For the last few weeks, she has been limping and using a walking cane as an assist device.  Unable to bend to knee past 30 degrees.  No previous knee injury.\par \par At her last visit, she had arthrocentesis.  She was prescribed  brace for empiric diagnosis of insufficiency fracture.  Pain has been improved with use of brace.\par \par MRI of the right knee:\par 1. High grade partial tear of the posterior root of the medial meniscus with mild medial extrusion of the body segment.\par 2. Subchondral edema at the weightbearing surface of the medial femoral condyle with associated marrow edema\par 3. Medial collateral ligament sprain\par 4. Mild to moderate degenerative cartilage loss in the medial and patellofemoral compartments\par 5. Moderate size joint effusion and small ruptured popliteal cyst\par 6. 1.4 x 1.1cm enchondroma in the distal femoral diaphysis

## 2022-07-19 ENCOUNTER — APPOINTMENT (OUTPATIENT)
Dept: NEUROSURGERY | Facility: CLINIC | Age: 57
End: 2022-07-19

## 2022-07-19 VITALS
HEIGHT: 63 IN | RESPIRATION RATE: 16 BRPM | DIASTOLIC BLOOD PRESSURE: 79 MMHG | HEART RATE: 71 BPM | TEMPERATURE: 98 F | SYSTOLIC BLOOD PRESSURE: 121 MMHG | OXYGEN SATURATION: 98 % | BODY MASS INDEX: 38.09 KG/M2 | WEIGHT: 215 LBS

## 2022-07-19 PROCEDURE — 99213 OFFICE O/P EST LOW 20 MIN: CPT

## 2022-07-20 ENCOUNTER — APPOINTMENT (OUTPATIENT)
Dept: ORTHOPEDIC SURGERY | Facility: CLINIC | Age: 57
End: 2022-07-20

## 2022-07-20 ENCOUNTER — APPOINTMENT (OUTPATIENT)
Dept: HEART AND VASCULAR | Facility: CLINIC | Age: 57
End: 2022-07-20

## 2022-07-20 VITALS
BODY MASS INDEX: 36.32 KG/M2 | DIASTOLIC BLOOD PRESSURE: 72 MMHG | SYSTOLIC BLOOD PRESSURE: 121 MMHG | HEART RATE: 68 BPM | HEIGHT: 63 IN | WEIGHT: 205 LBS | OXYGEN SATURATION: 97 % | TEMPERATURE: 98.6 F

## 2022-07-20 VITALS — HEIGHT: 63 IN | WEIGHT: 209 LBS | BODY MASS INDEX: 37.03 KG/M2

## 2022-07-20 PROCEDURE — 99212 OFFICE O/P EST SF 10 MIN: CPT

## 2022-07-20 PROCEDURE — 99214 OFFICE O/P EST MOD 30 MIN: CPT

## 2022-07-20 RX ORDER — ESCITALOPRAM OXALATE 20 MG/1
20 TABLET ORAL
Qty: 30 | Refills: 0 | Status: ACTIVE | COMMUNITY
Start: 2022-05-05

## 2022-07-20 RX ORDER — CYCLOBENZAPRINE HYDROCHLORIDE 10 MG/1
10 TABLET, FILM COATED ORAL
Qty: 30 | Refills: 0 | Status: ACTIVE | COMMUNITY
Start: 2022-02-23

## 2022-07-20 RX ORDER — ESCITALOPRAM OXALATE 10 MG/1
10 TABLET ORAL
Qty: 30 | Refills: 0 | Status: ACTIVE | COMMUNITY
Start: 2022-05-05

## 2022-07-20 RX ORDER — EZETIMIBE 10 MG/1
10 TABLET ORAL
Refills: 0 | Status: ACTIVE | COMMUNITY
Start: 2022-06-08

## 2022-07-20 RX ORDER — SUMATRIPTAN 25 MG/1
25 TABLET, FILM COATED ORAL
Qty: 9 | Refills: 0 | Status: ACTIVE | COMMUNITY
Start: 2022-04-12

## 2022-07-20 NOTE — REVIEW OF SYSTEMS
[Joint Pain] : joint pain [Joint Swelling] : joint swelling [Negative] : Heme/Lymph [Weight Loss (___ Lbs)] : no recent weight loss [SOB] : no shortness of breath [Lower Ext Edema] : no extremity edema [Palpitations] : no palpitations

## 2022-07-20 NOTE — PHYSICAL EXAM
[de-identified] : General: Well-nourished, well-developed, alert, and in no acute distress.\par Head: Normocephalic.\par Eyes: Pupils equal round reactive to light and accommodation, extraocular muscles intact, normal sclera.\par Nose: No nasal discharge.\par Cardiac: Regular rate. Extremities are warm and well perfused. Distal pulses are symmetric bilaterally.\par Respiratory: No labored breathing.\par Extremities: Sensation is intact distally bilaterally. Distal pulses are symmetric bilaterally\par Lymphatic: No regional lymphadenopathy, no lymphedema\par Neurologic: No focal deficits\par Skin: Normal skin color, texture, and turgor\par Psychiatric: Normal affect\par MSK: as noted above/below\par \par \par \par RIGHT KNEE:\par \par Inspection: no bruising, erythema\par Joint Effusion:TRACE\par ROM: Knee Flexion 100 , Knee Extension 0\par Palpation:MEDIAL JOINT LINE PAIN, PERIPATELLAR PAIN, No pain at patellar tendon, MFC/LFC, Medial/Lateral Tibial Plateau\par Leg Length Discrepancy:no\par Patella: no apprehension, +COMPRESSION\par Distal Pulses: normal\par Lower Extremity Strength:4+/5\par Lower Extremity Reflexes:normal, 2+\par Lower Extremity Sensation: normal\par \par Special Tests:\par Tanja: POSITIVE MEDIALLY\par Varus/Valgus:Negative, no instability\par \par LEFT KNEE:\par \par Inspection: no bruising, swelling, erythema\par Joint Effusion:no \par ROM: Knee Flexion 120 , Knee Extension 0\par Palpation: No pain at patellar tendon, MFC/LFC, Medial/Lateral Tibial Plateau\par Leg Length Discrepancy:no\par Patella: no apprehension\par Distal Pulses: normal\par Lower Extremity Strength:normal, 5/5 \par Lower Extremity Reflexes:normal, 2+\par Lower Extremity Sensation: normal\par \par \par \par

## 2022-07-20 NOTE — REASON FOR VISIT
[Other: ____] : [unfilled] [FreeTextEntry1] : EKG:\par 06/16/2022: Sinus rhythm, T wave abnormality V3-V6 (unchanged from prior)\par 04/22/2022: Sinus Rhythm, TW abnormality, lateral ischemia\par -------------------------------------------------\par ECHO:\par 7/11/2022: EF 60%. Nml LVSF. Trace MI and AI.\par 02/18/2016: EF 55%. Nml LV size and function. Trace TR and trace AI.\par -------------------------------------------------\par Stress: \par 03/16/2016:  EF 60%. Exc 3:31 min, 5 METS. No ischemia,no WMA, exercised-induced PH (PAS 45mmHg).\par -------------------------------------------------\par Cath: \par 2012 ([facility]): normal diagnostic C\par ------------------------------------------------\par Carotids:\par 02/18/2016: Minimal diffuse plaque noted in JENNIFER.\par -------------------------------------------------\par MIRIAM:\par 2/18/16: normal\par -------------------------------------------------\par Holter:\par 2015: PVC's

## 2022-07-20 NOTE — HISTORY OF PRESENT ILLNESS
[de-identified] : The patient is a 56 year old woman with history of chronic back pain s/p spinal cord stimulator presenting with right knee pain \par \par She was last seen ~ 5 weeks ago for a several week history of subacute, atraumatic right medial knee pain, stiffness, and swelling.  She recalls a mechanical fall in January 2022.  Pain has been progressive.  She first had posteromedial knee pain.  She was sent for venous duplex by her PMD who found baker's cyst.  She then had episode of baker's cyst rupture.  For the initial few weeks, she had been limping and using a walking cane as an assist device with inability to bend to knee past 30 degrees.  No previous knee injury.\par \par At her last visit, she had arthrocentesis.  She was prescribed  brace for empiric diagnosis of insufficiency fracture.  Pain has been improved with use of brace.\par \par MRI of the right knee:\par 1. High grade partial tear of the posterior root of the medial meniscus with mild medial extrusion of the body segment.\par 2. Subchondral edema at the weightbearing surface of the medial femoral condyle with associated marrow edema\par 3. Medial collateral ligament sprain\par 4. Mild to moderate degenerative cartilage loss in the medial and patellofemoral compartments\par 5. Moderate size joint effusion and small ruptured popliteal cyst\par 6. 1.4 x 1.1cm enchondroma in the distal femoral diaphysis

## 2022-07-20 NOTE — DISCUSSION/SUMMARY
[Medication Risks Reviewed] : Medication risks reviewed [de-identified] : The patient is a 56 year old woman with history of chronic back pain s/p spinal cord stimulator presenting with atraumatic, severe right knee pain secondary to medial meniscus root tear with medial femoral condyle insufficiency fracture.  She displays some clinical improvement with use of offloader brace.\par \par She also has incidental right distal femoral enchondroma.  Will need serial imaging ~3-6 months.\par \par Imaging/Diagnostics/Medical Records were interpreted and/or reviewed and results were reviewed with the patient in detail.  All questions were answered appropriately.\par \par Continue medial  brace.\par \par Agree with consultation with Rheum/Endocrinology for optimization of treatment for osteopenia.\par \par Ca-Vitamin D3 supplementation.\par \par Start home exercises - SLR, quad sets, heel slides.\par \par Patient understands that they may require surgery in the future.\par \par Discussed potential PT, HA injections in the future pending progress.\par \par Follow-up with my partners in 4 weeks.\par \par I explained to the patient that I will be leaving at the end of July 2022.  With established patients, I will continue to provide care during that time.  I explained that the Orthopedic team will be sending out letters regarding my departure and we have provided referrals in-office as requested.\par \par ------------------------------------------------------------------------------------------------------------------\par Patient appreciates and agrees with current plan.\par \par The patient's diagnosis above was evaluated by me, personally.  Diagnostic Testing and treatment options were discussed with the patient in detail.  The risks/benefits/potential complications of diagnostic testing/treatments were described in detail.  \par \par This note was generated using a mixture of manual typing and dragon medical dictation software.  A reasonable effort has been made for proofreading its contents, but typos may still remain.  If there are any questions or points of clarification needed please notify my office.\par \par I explained to the patient that I will be leaving at the end of July 2022.  With established patients, I will continue to provide care during that time.  I explained that the Orthopedic team will be sending out letters regarding my departure and we have provided referrals in-office as requested.\par >15 minutes of time was spent in total for the encounter.  >50% of the time spent was on face-to-face counseling/coordination of care and medical-decision making for this patient.\par \par \par \par

## 2022-07-20 NOTE — HISTORY OF PRESENT ILLNESS
[FreeTextEntry1] : Vibha Gamino is a 57 yo woman with a pmhx of hypothyroidism, anxiety, diet controlled DM, HLD, and palpitations. She has spine issues dating back to 2015 and s/p numerous surgeries. January 2022 she had SCS placement with good results  after adjustments and now pain has improved and swelling has resolved.  She was last seen with c/o palpitations which she now believes was due to her anxiety. She is concerned about her  weight (BMI 37) and remarks on being sedentary. She was trying a "medical medium diet" but stopped until she sees her endocrinologist. Presently she is doing well with no CV symptoms and here to discuss her echo results.\par \par

## 2022-07-20 NOTE — PHYSICAL EXAM
[Well Developed] : well developed [Well Nourished] : well nourished [No Acute Distress] : no acute distress [Normal Conjunctiva] : normal conjunctiva [Normal Venous Pressure] : normal venous pressure [No Carotid Bruit] : no carotid bruit [Normal S1, S2] : normal S1, S2 [No Murmur] : no murmur [No Rub] : no rub [No Gallop] : no gallop [Clear Lung Fields] : clear lung fields [Good Air Entry] : good air entry [No Respiratory Distress] : no respiratory distress  [Soft] : abdomen soft [Non Tender] : non-tender [No Masses/organomegaly] : no masses/organomegaly [Normal Bowel Sounds] : normal bowel sounds [Normal Gait] : normal gait [No Edema] : no edema [No Cyanosis] : no cyanosis [No Clubbing] : no clubbing [No Varicosities] : no varicosities [Edema ___] : edema [unfilled] [No Rash] : no rash [No Skin Lesions] : no skin lesions [Moves all extremities] : moves all extremities [No Focal Deficits] : no focal deficits [Normal Speech] : normal speech [Alert and Oriented] : alert and oriented [Normal memory] : normal memory [de-identified] : wears a brace to RLE

## 2022-07-20 NOTE — ASSESSMENT
[FreeTextEntry1] : Hyperlipidemia: \par - Continue pravastatin 40mg daily\par - Continue Ezetimibe 10mg daily\par - Discussed therapeutic lifestyle changes to promote improved lipid metabolism (low fat, low cholesterol heart healthy diet, striving for optimal weight control, and aerobic exercises as tolerated)\par - Will consider increasing dose if CAD depending on study results\par \par Palpitations: resolved and nml echo\par - Continue Toprol XL 25mg daily\par \par Obesity (BMI 37):\par - We had an extensive discussion about the therapeutic lifestyle changes to promote cardiovascular fitness and achieving goal weight.\par \par Hypothyroid: \par - Continue liothyronine 25 mg daily\par - Will follow up with Dr. Adam ann\par \par Anxiety: \par - Continue Abilify 5 mg daily \par - Continue Clonazepam 1mg TID prn\par

## 2022-08-01 ENCOUNTER — APPOINTMENT (OUTPATIENT)
Dept: ORTHOPEDIC SURGERY | Facility: CLINIC | Age: 57
End: 2022-08-01

## 2022-08-01 DIAGNOSIS — S83.241A OTHER TEAR OF MEDIAL MENISCUS, CURRENT INJURY, RIGHT KNEE, INITIAL ENCOUNTER: ICD-10-CM

## 2022-08-01 PROCEDURE — 20610 DRAIN/INJ JOINT/BURSA W/O US: CPT | Mod: RT

## 2022-08-01 PROCEDURE — 99204 OFFICE O/P NEW MOD 45 MIN: CPT | Mod: 25

## 2022-08-01 NOTE — DISCUSSION/SUMMARY
[de-identified] : 56 year old female with sub acute sub-chondral stress rxn and chronic mneiscus tear. Currently imrpoving with  bracing and PT. \par Plan \par Gave cortisone today. \par Continue PT and non op care. \par F/U 1 month to see if surgical intervention is needed.

## 2022-08-01 NOTE — PHYSICAL EXAM
[de-identified] : Right knee medial joint tenderness but improved from previous exam 3 weks prior. \par Positive mandi \par ROM 0-125 degrees.  [de-identified] : See previous MRI and knee x-ray readings. X-rays show no joint collapse and no acute changes just PF malalignment and subluxation. \par \par

## 2022-08-01 NOTE — PROCEDURE
[de-identified] : The right knee was prepped with alchohol and ethyl chloride was used to numb the skin. A 3 cc injection with 1 cc xylocaine, 1cc sensoricaine and 1 cc kenalog , was given without complication into the knee joint. Patient instructed that there may be an inflammatory flare for 24 hrs , to use ice or advil if needed\par \par

## 2022-08-01 NOTE — HISTORY OF PRESENT ILLNESS
[de-identified] : Mariano is seen on referral by Dr Desai who has relocated. He had seen her for knee pain . Her work up had revealed a sub chondral stress reaction with significant edema and a medial meniscus tear. SHe has been in a medial unloading brace for 2-3 weeks with significant improvement in pain. She comes for follow-up and further treatment\par \par

## 2022-08-02 ENCOUNTER — APPOINTMENT (OUTPATIENT)
Dept: NEUROSURGERY | Facility: CLINIC | Age: 57
End: 2022-08-02

## 2022-08-02 DIAGNOSIS — Z96.89 PRESENCE OF OTHER SPECIFIED FUNCTIONAL IMPLANTS: ICD-10-CM

## 2022-08-02 PROCEDURE — 99441: CPT

## 2022-08-02 RX ORDER — CYCLOBENZAPRINE HYDROCHLORIDE 10 MG/1
10 TABLET, FILM COATED ORAL TWICE DAILY
Qty: 60 | Refills: 0 | Status: ACTIVE | COMMUNITY
Start: 2022-08-02 | End: 1900-01-01

## 2022-08-08 PROBLEM — Z96.89 S/P INSERTION OF SPINAL CORD STIMULATOR: Status: ACTIVE | Noted: 2022-03-22

## 2022-08-08 NOTE — REASON FOR VISIT
[Home] : at home, [unfilled] , at the time of the visit. [Medical Office: ([facility])___] : at the medical office located in  [Verbal consent obtained from patient] : the patient, [unfilled] [Follow-Up: _____] : a [unfilled] follow-up visit [FreeTextEntry1] : Ms Gamino states the increased dose of her cyclobenzaprine has been helpful in reducing her spams and allowing her some relief and comfort to go to sleep at night. She has contacted the Endocrinologist and has an an appointment scheduled to see them regarding her osteopenia. With regard to her SCS, she has been contacted by the rep who has been assisting with reprogramming and achieving some relief of her pain.\par \par She is being followed by ortho re her knee.\par \par Overall, the patient is doing better and will followup in 3-4 weeks to see how her new programming is helping. \par \par She will follow-up with Endocrinology in October as well as Orthopedics for her knee. She will continue using her immobilizer. \par \par

## 2022-08-11 ENCOUNTER — APPOINTMENT (OUTPATIENT)
Dept: ORTHOPEDIC SURGERY | Facility: CLINIC | Age: 57
End: 2022-08-11

## 2022-08-11 DIAGNOSIS — M84.451D PATHOLOGICAL FRACTURE, RIGHT FEMUR, SUBSEQUENT ENCOUNTER FOR FRACTURE WITH ROUTINE HEALING: ICD-10-CM

## 2022-08-11 PROCEDURE — 73560 X-RAY EXAM OF KNEE 1 OR 2: CPT | Mod: LT,RT

## 2022-08-11 PROCEDURE — 99213 OFFICE O/P EST LOW 20 MIN: CPT

## 2022-08-11 NOTE — DISCUSSION/SUMMARY
[de-identified] : Mariano had big improvement with her cortisone but she has been walking all over. \par Unfortunatley is just starting PT this week.\par I still want 3 more weeks in the  brace and add cane to heal the medial femoral stress rxn. \par x-rays still show no joint collapse or obvious fx. \par If at that time in 3 weeks pain persists will consider arthroscopic partial menisectomy or root repair. and see if concomitant retrograde drilling  osteobioplasty is needed \par \par

## 2022-08-11 NOTE — PHYSICAL EXAM
[de-identified] : Right knee medial joint tenderness rom 0-125 degrees.  [de-identified] : New standing AP and lateral knee film of right knee still shows no obvious fx or collapse.

## 2022-08-11 NOTE — HISTORY OF PRESENT ILLNESS
[de-identified] : 55 y/o female presents today for follow up for right knee pain. Patient states she starts physical therapy on Saturday but that the cortisone injection give on 8/01/22 gave her great relief but is now again starting to experience onset pain. She comes in early asking for a second injection

## 2022-08-25 ENCOUNTER — NON-APPOINTMENT (OUTPATIENT)
Age: 57
End: 2022-08-25

## 2022-08-29 ENCOUNTER — APPOINTMENT (OUTPATIENT)
Dept: ORTHOPEDIC SURGERY | Facility: CLINIC | Age: 57
End: 2022-08-29

## 2022-08-29 VITALS
HEART RATE: 73 BPM | DIASTOLIC BLOOD PRESSURE: 77 MMHG | TEMPERATURE: 97.2 F | OXYGEN SATURATION: 97 % | WEIGHT: 209 LBS | HEIGHT: 63 IN | BODY MASS INDEX: 37.03 KG/M2 | SYSTOLIC BLOOD PRESSURE: 114 MMHG

## 2022-08-29 PROCEDURE — 99214 OFFICE O/P EST MOD 30 MIN: CPT

## 2022-08-29 NOTE — DISCUSSION/SUMMARY
[de-identified] : 56 year old female with KL2 x-rays and no major joint narrowing plus medial meniscal root tear now failed non operative Rx for past 4-6 weeks. \par Plan \par Arthroscopic partial medial menisectomy vs possible root repair if appropriate. \par SHe understands the difference in the two post operative courses. \par She will get medical clearance and covid testing and proceed with arthroscopic surgery once cealred.

## 2022-08-29 NOTE — HISTORY OF PRESENT ILLNESS
[de-identified] : Vibha continues with significant pain and discomfort from her medial meniscus tear near the meniscal posterior root. \par Her x-rays show no major joint narrowing but MRI shows extrusion and early chondrosis or KL2 OA. \par

## 2022-08-30 ENCOUNTER — APPOINTMENT (OUTPATIENT)
Dept: ORTHOPEDIC SURGERY | Facility: CLINIC | Age: 57
End: 2022-08-30
Payer: MEDICAID

## 2022-08-30 PROCEDURE — 99214 OFFICE O/P EST MOD 30 MIN: CPT

## 2022-08-30 RX ORDER — HYDROCODONE BITARTRATE AND ACETAMINOPHEN 5; 325 MG/1; MG/1
5-325 TABLET ORAL
Qty: 40 | Refills: 0 | Status: ACTIVE | COMMUNITY
Start: 2022-08-30 | End: 1900-01-01

## 2022-08-30 RX ORDER — MELOXICAM 15 MG/1
15 TABLET ORAL DAILY
Qty: 30 | Refills: 1 | Status: ACTIVE | COMMUNITY
Start: 2022-08-30 | End: 1900-01-01

## 2022-08-30 NOTE — HISTORY OF PRESENT ILLNESS
[de-identified] : 56 year female  here today for pre-op teaching session for planned Right knee arthroscopic partial medial meniscectomy vs possible root repair on 9/16/22 at [facility].\par The MRI of right knee from 7-1-22 shows\par 1. High grade partial tear of posterior root of the medial meniscus with mild medial extrusion of the body segment\par 2. Subchondral fracture at the weightbearing surface of the medial femoral condyle with associated marrow edema\par 3. MCL sprain\par 4. Mild to moderate degenerative cartilage loss of medial and PF compartments\par 5. 1.4x1.1cm enchondroma in distal femoral diaphysis \par \par She still needs to see her PCP for medical clearance and to be optimized for surgery.\par

## 2022-08-30 NOTE — DISCUSSION/SUMMARY
[de-identified] : Full post op and pre op teaching program was performed today. Risks and benefits of the surgical procedure were discussed informed consent obtained and post op exercises described. Post op meds and PO rehab were arranged,\par All questions were answered.\par \par Plan: \par - Proceed with Right knee arthroscopic partial medial meniscectomy vs possible root repair on 9/16/22 at [facility] pending COVID PCR and medical clearence. \par

## 2022-08-30 NOTE — PHYSICAL EXAM
[de-identified] : Patient is well nourished, well-developed, in no acute distress, with appropriate mood and affect. The patient is oriented to time, place, and person. Gait evaluation does not reveal a limp. The skin examination does not reveal obvious lesions, lacerations, or ecchymosis.\par \par Right knee= No effusion, ROM 0-110, Medial joint tenderness, Positive Sydnie/Tanja

## 2022-09-07 RX ORDER — MONTELUKAST 4 MG/1
1 TABLET, CHEWABLE ORAL
Qty: 0 | Refills: 0 | DISCHARGE

## 2022-09-07 RX ORDER — ESCITALOPRAM OXALATE 10 MG/1
30 TABLET, FILM COATED ORAL
Qty: 0 | Refills: 0 | DISCHARGE

## 2022-09-07 RX ORDER — ESCITALOPRAM OXALATE 10 MG/1
1 TABLET, FILM COATED ORAL
Qty: 0 | Refills: 0 | DISCHARGE

## 2022-09-07 RX ORDER — CHLORHEXIDINE GLUCONATE 213 G/1000ML
1 SOLUTION TOPICAL ONCE
Refills: 0 | Status: DISCONTINUED | OUTPATIENT
Start: 2022-09-09 | End: 2022-09-23

## 2022-09-07 NOTE — ASU PATIENT PROFILE, ADULT - VISION (WITH CORRECTIVE LENSES IF THE PATIENT USUALLY WEARS THEM):
uses eyeglasses/Partially impaired: cannot see medication labels or newsprint, but can see obstacles in path, and the surrounding layout; can count fingers at arm's length uses eye glasses/Partially impaired: cannot see medication labels or newsprint, but can see obstacles in path, and the surrounding layout; can count fingers at arm's length

## 2022-09-07 NOTE — ASU PATIENT PROFILE, ADULT - NS PREOP UNDERSTANDS INFO
No solid food, dairy, candy or gum after 10:30PM tonight, water is allowed before 4:30am tomorrow; come with photo ID, vaccination and insurance card; arrange for escort; no jewelries, contact lens; no smoking, alcohol drinking recreational drug use tonight. Address and call back number provided./yes

## 2022-09-07 NOTE — ASU PATIENT PROFILE, ADULT - NSICDXPASTMEDICALHX_GEN_ALL_CORE_FT
PAST MEDICAL HISTORY:  Anxiety     Anxiety and depression     Asthma     Chronic low back pain with bilateral sciatica     Deviated septum     Diabetes mellitus     H/O carpal tunnel repair     H/O radiculopathy     High cholesterol     History of IBS     HTN (hypertension)     Hypertension     Hypocalcemia     Hypothyroid     Migraine     Spinal cord stimulator status     Stress incontinence     Thyroid cancer

## 2022-09-07 NOTE — ASU PATIENT PROFILE, ADULT - PRO MENTAL HEALTH SX RECENT
bouts of crying/feeling depressed/feeling down/little interest or pleasure in doing things/over eating/sad/change in sleep pattern

## 2022-09-07 NOTE — ASU PATIENT PROFILE, ADULT - PROVIDER NOTIFICATION
Impression: Age-related nuclear cataract, bilateral: H25.13. Plan: Cataracts are stable and not interfering with her vision. No treatment currently recommended. The patient will monitor vision changes and contact us with any decrease in vision.
Impression: Open angle with borderline findings, low risk, bilateral: H40.013. Plan: Due to enlarged C/D Ratio OU. No know family HX of glaucoma. RNFL remaining stable OU compared to scan from 04/2018 Thicker than average pachys OU at 560/568. Normotensive IOP OU. No treatment needed.  
Will continue to observe in 6 months
Declines

## 2022-09-08 ENCOUNTER — TRANSCRIPTION ENCOUNTER (OUTPATIENT)
Age: 57
End: 2022-09-08

## 2022-09-09 ENCOUNTER — TRANSCRIPTION ENCOUNTER (OUTPATIENT)
Age: 57
End: 2022-09-09

## 2022-09-09 ENCOUNTER — OUTPATIENT (OUTPATIENT)
Dept: OUTPATIENT SERVICES | Facility: HOSPITAL | Age: 57
LOS: 1 days | Discharge: HOME | End: 2022-09-09

## 2022-09-09 ENCOUNTER — APPOINTMENT (OUTPATIENT)
Dept: ORTHOPEDIC SURGERY | Facility: AMBULATORY SURGERY CENTER | Age: 57
End: 2022-09-09

## 2022-09-09 VITALS
RESPIRATION RATE: 16 BRPM | TEMPERATURE: 98 F | SYSTOLIC BLOOD PRESSURE: 108 MMHG | HEART RATE: 78 BPM | OXYGEN SATURATION: 95 % | DIASTOLIC BLOOD PRESSURE: 59 MMHG

## 2022-09-09 VITALS
RESPIRATION RATE: 14 BRPM | DIASTOLIC BLOOD PRESSURE: 71 MMHG | TEMPERATURE: 99 F | OXYGEN SATURATION: 98 % | HEART RATE: 81 BPM | WEIGHT: 209.88 LBS | SYSTOLIC BLOOD PRESSURE: 141 MMHG | HEIGHT: 63 IN

## 2022-09-09 DIAGNOSIS — Z98.890 OTHER SPECIFIED POSTPROCEDURAL STATES: Chronic | ICD-10-CM

## 2022-09-09 DIAGNOSIS — E89.0 POSTPROCEDURAL HYPOTHYROIDISM: Chronic | ICD-10-CM

## 2022-09-09 DIAGNOSIS — Z98.1 ARTHRODESIS STATUS: Chronic | ICD-10-CM

## 2022-09-09 LAB — GLUCOSE BLDC GLUCOMTR-MCNC: 109 MG/DL — HIGH (ref 70–99)

## 2022-09-09 PROCEDURE — 29882 ARTHRS KNE SRG MNISC RPR M/L: CPT | Mod: RT

## 2022-09-09 DEVICE — IMPLANTABLE DEVICE: Type: IMPLANTABLE DEVICE | Site: RIGHT (RIGHT KNEE) | Status: FUNCTIONAL

## 2022-09-09 RX ORDER — HYDROMORPHONE HYDROCHLORIDE 2 MG/ML
0.5 INJECTION INTRAMUSCULAR; INTRAVENOUS; SUBCUTANEOUS
Refills: 0 | Status: DISCONTINUED | OUTPATIENT
Start: 2022-09-09 | End: 2022-09-09

## 2022-09-09 RX ORDER — FENTANYL CITRATE 50 UG/ML
50 INJECTION INTRAVENOUS
Refills: 0 | Status: DISCONTINUED | OUTPATIENT
Start: 2022-09-09 | End: 2022-09-09

## 2022-09-09 RX ORDER — SODIUM CHLORIDE 9 MG/ML
1000 INJECTION, SOLUTION INTRAVENOUS
Refills: 0 | Status: DISCONTINUED | OUTPATIENT
Start: 2022-09-09 | End: 2022-09-23

## 2022-09-09 RX ORDER — ACETAMINOPHEN 500 MG
1000 TABLET ORAL ONCE
Refills: 0 | Status: COMPLETED | OUTPATIENT
Start: 2022-09-09 | End: 2022-09-09

## 2022-09-09 RX ORDER — ONDANSETRON 8 MG/1
4 TABLET, FILM COATED ORAL ONCE
Refills: 0 | Status: DISCONTINUED | OUTPATIENT
Start: 2022-09-09 | End: 2022-09-23

## 2022-09-09 RX ORDER — OXYCODONE HYDROCHLORIDE 5 MG/1
5 TABLET ORAL ONCE
Refills: 0 | Status: DISCONTINUED | OUTPATIENT
Start: 2022-09-09 | End: 2022-09-09

## 2022-09-09 RX ORDER — METHOCARBAMOL 500 MG/1
2 TABLET, FILM COATED ORAL
Qty: 0 | Refills: 0 | DISCHARGE

## 2022-09-09 RX ADMIN — Medication 1000 MILLIGRAM(S): at 07:20

## 2022-09-09 NOTE — ASU DISCHARGE PLAN (ADULT/PEDIATRIC) - NS MD DC FALL RISK RISK
For information on Fall & Injury Prevention, visit: [email or web address] OR  [email or web address] OR  https://www.cdc.gov/steadi/patient.html

## 2022-09-09 NOTE — BRIEF OPERATIVE NOTE - NSICDXBRIEFPROCEDURE_GEN_ALL_CORE_FT
PROCEDURES:  Repair of medial meniscus of right knee 09-Sep-2022 11:37:51 posterior root repair Keyona Van

## 2022-09-09 NOTE — PRE-ANESTHESIA EVALUATION ADULT - NSANTHADDINFOFT_GEN_ALL_CORE
Anesthetic plan was thoroughly discussed including the risk, benefit, alternatives and potential complications such as minor injuries to airway/teeth/lips, nerve damage, aspiration, hemodynamic instability and organ dysfunction related to the underlying pathology and the planned procedure. All questions were answered and the patient wants to proceed with the GA+RA(ACB+IPACK) plan. Consent in chart.

## 2022-09-09 NOTE — ASU DISCHARGE PLAN (ADULT/PEDIATRIC) - CARE PROVIDER_API CALL
Primo Gibson)  Orthopaedic Sports Medicine; Orthopaedic Surgery  [address] [place] [address]  Phone: [phone]  Fax: [phone]  Follow Up Time:

## 2022-09-13 ENCOUNTER — APPOINTMENT (OUTPATIENT)
Dept: RHEUMATOLOGY | Facility: CLINIC | Age: 57
End: 2022-09-13

## 2022-09-13 DIAGNOSIS — J45.909 UNSPECIFIED ASTHMA, UNCOMPLICATED: ICD-10-CM

## 2022-09-13 DIAGNOSIS — F41.9 ANXIETY DISORDER, UNSPECIFIED: ICD-10-CM

## 2022-09-13 DIAGNOSIS — G47.33 OBSTRUCTIVE SLEEP APNEA (ADULT) (PEDIATRIC): ICD-10-CM

## 2022-09-13 DIAGNOSIS — E66.9 OBESITY, UNSPECIFIED: ICD-10-CM

## 2022-09-13 DIAGNOSIS — Z98.1 ARTHRODESIS STATUS: ICD-10-CM

## 2022-09-13 DIAGNOSIS — I10 ESSENTIAL (PRIMARY) HYPERTENSION: ICD-10-CM

## 2022-09-13 DIAGNOSIS — M23.231 DERANGEMENT OF OTHER MEDIAL MENISCUS DUE TO OLD TEAR OR INJURY, RIGHT KNEE: ICD-10-CM

## 2022-09-13 DIAGNOSIS — F32.A DEPRESSION, UNSPECIFIED: ICD-10-CM

## 2022-09-13 DIAGNOSIS — G43.909 MIGRAINE, UNSPECIFIED, NOT INTRACTABLE, WITHOUT STATUS MIGRAINOSUS: ICD-10-CM

## 2022-09-13 DIAGNOSIS — E11.9 TYPE 2 DIABETES MELLITUS WITHOUT COMPLICATIONS: ICD-10-CM

## 2022-09-13 DIAGNOSIS — Z79.84 LONG TERM (CURRENT) USE OF ORAL HYPOGLYCEMIC DRUGS: ICD-10-CM

## 2022-09-13 DIAGNOSIS — Z85.850 PERSONAL HISTORY OF MALIGNANT NEOPLASM OF THYROID: ICD-10-CM

## 2022-09-13 DIAGNOSIS — E78.00 PURE HYPERCHOLESTEROLEMIA, UNSPECIFIED: ICD-10-CM

## 2022-09-13 DIAGNOSIS — E89.0 POSTPROCEDURAL HYPOTHYROIDISM: ICD-10-CM

## 2022-09-15 ENCOUNTER — APPOINTMENT (OUTPATIENT)
Dept: ORTHOPEDIC SURGERY | Facility: CLINIC | Age: 57
End: 2022-09-15

## 2022-09-15 PROBLEM — I10 ESSENTIAL (PRIMARY) HYPERTENSION: Chronic | Status: ACTIVE | Noted: 2022-09-07

## 2022-09-15 PROCEDURE — 99024 POSTOP FOLLOW-UP VISIT: CPT

## 2022-09-15 NOTE — HISTORY OF PRESENT ILLNESS
[___ Days Post Op] : post op day #[unfilled] [Procedure: ___] : status post [unfilled] [2] : the patient reports pain that is 2/10 in severity [Clean/Dry/Intact] : clean, dry and intact [Healed] : healed [Doing Well] : is doing well [No Sign of Infection] : is showing no signs of infection [Adequate Pain Control] : has adequate pain control [Sutures Removed] : sutures were removed [Steri-Strips Removed & Replaced] : steri-strips removed and replaced [Chills] : no chills [Fever] : no fever [Erythema] : not erythematous [Discharge] : absent of discharge [Dehiscence] : not dehisced [de-identified] : Pt doing well. Wearing brace locked and using crutches. Started PT this week. Not icing and elevating. [de-identified] : ROM 0-90, Quad 4/5. + swelling and bruising [de-identified] : Continue to wear brace locked in ext when walking and bend to 90 when sitting, with crutches for 2 more weeks. Then can unlock brace. Continue PT. Start to ice and elevate more frequently. F/u 4 weeks

## 2022-09-20 ENCOUNTER — APPOINTMENT (OUTPATIENT)
Dept: ORTHOPEDIC SURGERY | Facility: CLINIC | Age: 57
End: 2022-09-20

## 2022-09-20 VITALS
TEMPERATURE: 97.6 F | DIASTOLIC BLOOD PRESSURE: 77 MMHG | HEART RATE: 83 BPM | SYSTOLIC BLOOD PRESSURE: 128 MMHG | OXYGEN SATURATION: 80 %

## 2022-09-20 DIAGNOSIS — S80.01XA CONTUSION OF RIGHT KNEE, INITIAL ENCOUNTER: ICD-10-CM

## 2022-09-21 NOTE — HISTORY OF PRESENT ILLNESS
[de-identified] : 55 yo female 11 days s/p right knee medial root repair, debridement and chondroplasty. She presents today for redness and swelling over her incision. She also has lower leg swelling and bruising. She denies fever/chills or drainage from the wound. No knee joint pain. She stopped wearing her brace yesterday due to the pain of one strap crossing her incision. Pt states she physical therapy transitioned her from crutches to cane on Friday, Sept 16 when she was at PT.

## 2022-09-21 NOTE — PHYSICAL EXAM
[de-identified] : Right knee= + swelling/bruising, ROM 0-100, incision healing appropriately, Quad 4/5\par Tibia incision= Hematoma under incision 2inx1.5in, mild redness along incision that blanches, mild TTP around incision, no drainage

## 2022-09-21 NOTE — DISCUSSION/SUMMARY
[de-identified] : 55 yo female 11 days s/p right knee medial root repair, debridement and chondroplasty. She presents today for redness and swelling over her incision. She denies fever/chills or drainage from the wound and she does not have a fever in office today. No knee joint pain and good ROM at this point post-op. She was transitioned by PT to a cane too early. She stopped wearing her brace due to the pain of one strap crossing her incision. After being examined by Dr. Damon and To, both agreed patient most likely has hematoma under tibial incision. No antibiotics required unless she starts to have fevers or increased constant pain along incision.\par \par Plan\par Go back to using crutches and wear brace locked in ext when walking and sleeping for 2 weeks\par Elevate and ice knee more frequently to help with swelling\par Monitor temperature at home, if she develops fever and will consider sending antibiotic\par Will f/u with telephone call later this week\par f/u in 3 weeks\par

## 2022-09-23 PROCEDURE — 99024 POSTOP FOLLOW-UP VISIT: CPT

## 2022-09-27 ENCOUNTER — APPOINTMENT (OUTPATIENT)
Dept: NEUROSURGERY | Facility: CLINIC | Age: 57
End: 2022-09-27

## 2022-09-27 DIAGNOSIS — M54.41 LUMBAGO WITH SCIATICA, LEFT SIDE: ICD-10-CM

## 2022-09-27 DIAGNOSIS — G89.29 LUMBAGO WITH SCIATICA, LEFT SIDE: ICD-10-CM

## 2022-09-27 DIAGNOSIS — M54.42 LUMBAGO WITH SCIATICA, LEFT SIDE: ICD-10-CM

## 2022-09-27 PROCEDURE — 99441: CPT

## 2022-10-03 PROBLEM — M54.42 CHRONIC BILATERAL LOW BACK PAIN WITH BILATERAL SCIATICA: Status: ACTIVE | Noted: 2021-12-14

## 2022-10-03 NOTE — HISTORY OF PRESENT ILLNESS
[Home] : at home, [unfilled] , at the time of the visit. [Medical Office: ([facility])___] : at the medical office located in  [Verbal consent obtained from patient] : the patient, [unfilled] [FreeTextEntry1] : Pt is s/p knee surgery. She has sig pain and limited mobility post op. it is hard to  SCS relief in this setting.

## 2022-10-03 NOTE — ASSESSMENT
[FreeTextEntry1] : Vibha will cont her convalescence from knee surgery. She is currently participating in PT. She will f/u with me in person for further adjustment/reassessment of SCS efficacy when she is improved from her knee surgery

## 2022-10-05 ENCOUNTER — APPOINTMENT (OUTPATIENT)
Dept: ENDOCRINOLOGY | Facility: CLINIC | Age: 57
End: 2022-10-05

## 2022-10-05 VITALS
WEIGHT: 217 LBS | HEART RATE: 94 BPM | DIASTOLIC BLOOD PRESSURE: 78 MMHG | BODY MASS INDEX: 38.44 KG/M2 | SYSTOLIC BLOOD PRESSURE: 138 MMHG

## 2022-10-05 DIAGNOSIS — E89.0 POSTPROCEDURAL HYPOTHYROIDISM: ICD-10-CM

## 2022-10-05 PROCEDURE — 99204 OFFICE O/P NEW MOD 45 MIN: CPT

## 2022-10-05 RX ORDER — LEVOTHYROXINE SODIUM 200 UG/1
200 CAPSULE ORAL
Qty: 30 | Refills: 5 | Status: ACTIVE | COMMUNITY
Start: 2022-10-05 | End: 1900-01-01

## 2022-10-05 RX ORDER — ETODOLAC 400 MG/1
400 TABLET, FILM COATED ORAL
Qty: 60 | Refills: 1 | Status: DISCONTINUED | COMMUNITY
Start: 2022-07-13 | End: 2022-10-05

## 2022-10-05 RX ORDER — LIOTHYRONINE SODIUM 25 UG/1
25 TABLET ORAL DAILY
Refills: 0 | Status: DISCONTINUED | COMMUNITY
Start: 2022-07-11 | End: 2022-10-05

## 2022-10-05 RX ORDER — METHOCARBAMOL 750 MG/1
750 TABLET, FILM COATED ORAL EVERY 8 HOURS
Qty: 21 | Refills: 0 | Status: DISCONTINUED | COMMUNITY
Start: 2022-01-06 | End: 2022-10-05

## 2022-10-05 RX ORDER — CALCIUM CARBONATE/VITAMIN D3 600 MG-10
600-10 TABLET ORAL
Qty: 60 | Refills: 0 | Status: DISCONTINUED | COMMUNITY
Start: 2022-07-08 | End: 2022-10-05

## 2022-10-05 RX ORDER — BUTALBITAL, ACETAMINOPHEN AND CAFFEINE 325; 50; 40 MG/1; MG/1; MG/1
50-325-40 TABLET ORAL
Qty: 28 | Refills: 0 | Status: DISCONTINUED | COMMUNITY
Start: 2022-05-31 | End: 2022-10-05

## 2022-10-05 NOTE — PHYSICAL EXAM
[Alert] : alert [No Acute Distress] : no acute distress [No Proptosis] : no proptosis [No Lid Lag] : no lid lag [Normal Hearing] : hearing was normal [No LAD] : no lymphadenopathy [Well Healed Scar] : well healed scar [Clear to Auscultation] : lungs were clear to auscultation bilaterally [Normal S1, S2] : normal S1 and S2 [Regular Rhythm] : with a regular rhythm [Normal Affect] : the affect was normal [Normal Mood] : the mood was normal [de-identified] : thyroid not palpable [de-identified] : mild acanthosis nigricans

## 2022-10-05 NOTE — HISTORY OF PRESENT ILLNESS
[FreeTextEntry1] : had thyroid surgery in 2011 for thyroid cancer.  no NEWELL treatment given.\par For past 2 years, TSH has been high despite increasing levothyroxine doses.\par She feels horrible-- always tired, cannot lose weight/gaining weight, constipated, sleepy\par no FH of thyroid disease \par She recently had knee surgery\par labs reviewed from [facility]:  TSH fluctuating  in the past 6 months. \par she is taking levothyroxine every morning, apart from other pills.  She doesn't think she was doing anything differently in April (when TSH was 17) compared to now (TSH 73)\par she has some GI symptoms but nothing c/w malabsorption issues for the past 2 years\par \par Meds\par levothyroxine 400mcg/day\par calcitriol\par calcium 600mg, one per day\par omeprazole 40mg/day -- taking for years\par Abilify, lexapro, Klonopin\par cyclobenzaprine\par asthma meds

## 2022-10-05 NOTE — ASSESSMENT
[FreeTextEntry1] : Surgical hypothyroidism, goal TSH 1-5 range.  Obesity BMI 38.  h/o thyroid cancer.\par Being on PPI may be reducing acid so much that levothyroxine is not being absorbed.   Will try changing to gel capsule (Tirosint generic) that is not pH dependent for absorption: 200mcg/day, take at bedtime.  This is the dose she used to take, that used to control her levels.\par also will change calcium to calcium citrate since she is on PPI (calcium carbonate optimally absorbed in acidic environment). Take BID, since dosing is lower.\par Asked pt to have Tg added on to recent labs.  If Tg is undetectable with high TSH, then it is reassuring that thyroid cancer is likely cured.\par goal calcium 8-9 range.\par Once TSH is near normal range, will make recommendations for weight loss. \par RTO 2 months to recheck TSH

## 2022-10-05 NOTE — DATA REVIEWED
[FreeTextEntry1] : 10/22 73.05, tot chol 219, HDL 37, trig 138, , A1c 5.6%, 25D 24\par 8/22  TSH 27.67, tot chol 222, HDL 44, trig 101, , 25D 26\par 7/22  .96, tot chol 291, HDL 44, trig 176, , A1c 5.8%, 25D 17\par 5/22 TSH 80.83, tot chol 233, HDL 43, trig 106, \par 4/22  TSH 19.74\par

## 2022-10-05 NOTE — REASON FOR VISIT
[Initial Evaluation] : an initial evaluation [Hypothyroidism] : hypothyroidism [Thyroid Cancer] : thyroid cancer [FreeTextEntry2] : Dr Fay

## 2022-10-11 ENCOUNTER — APPOINTMENT (OUTPATIENT)
Dept: ORTHOPEDIC SURGERY | Facility: CLINIC | Age: 57
End: 2022-10-11

## 2022-10-11 DIAGNOSIS — M54.16 RADICULOPATHY, LUMBAR REGION: ICD-10-CM

## 2022-10-11 DIAGNOSIS — M43.16 SPONDYLOLISTHESIS, LUMBAR REGION: ICD-10-CM

## 2022-10-11 DIAGNOSIS — G89.29 DORSALGIA, UNSPECIFIED: ICD-10-CM

## 2022-10-11 DIAGNOSIS — M54.9 DORSALGIA, UNSPECIFIED: ICD-10-CM

## 2022-10-11 PROCEDURE — 72110 X-RAY EXAM L-2 SPINE 4/>VWS: CPT

## 2022-10-11 PROCEDURE — 72070 X-RAY EXAM THORAC SPINE 2VWS: CPT

## 2022-10-11 PROCEDURE — 99214 OFFICE O/P EST MOD 30 MIN: CPT | Mod: 24

## 2022-10-11 NOTE — ASSESSMENT
[FreeTextEntry1] : 56 year old female presents with acute exacerbation of chronic back pain. The pain radiates to her legs.  She has severe pain radiating to her left leg. She has a spinal cord stimulator placed by Dr. Ramirze.  The SCS trial gave good relief of pain for both legs but the final SCS placement has not helped her left leg despite adjustments.  She has numbness in her left leg.  She is otherwise neurologically intact.  She will be sent for a lumbar MRI.  She reports the SCS is MRI compatible and just needs to be turned off for her MRI.  She will followup once the MRI has been completed.  She will followup with Dr. Ramirez regarding SCS.  We discussed red flag symptoms that would require emergent evaluation. She knows to call with any questions or concerns or if her symptoms acutely worsen.

## 2022-10-11 NOTE — HISTORY OF PRESENT ILLNESS
[de-identified] : 56 year old female presents with acute exacerbation of chronic back pain. The pain radiates to her legs.  She has severe pain radiating to her left leg. She has a spinal cord stimulator placed by Dr. Ramirez.  The SCS trial gave good relief of pain for both legs but the final SCS placement has not helped her left leg despite adjustments.  She has numbness in her left leg.  Activity makes it worse. She denies recent illness, fevers, weakness, balance problems, saddle anesthesia, urinary retention or fecal incontinence. She has tried PT without relief.

## 2022-10-11 NOTE — PHYSICAL EXAM
[de-identified] : General: No acute distress, conversant, well-nourished.\par Head: Normocephalic, atraumatic\par Neck: trachea midline, FROM\par Heart: normotensive and normal rate and rhythm\par Lungs: No labored breathing\par Skin: No abrasions, no rashes, no edema\par Psych: Alert and oriented to person, place and time\par Extremities: no peripheral edema or digital cyanosis\par Gait: Normal gait. Can perform tandem gait.  \par Vascular: warm and well perfused distally, palpable distal pulses\par \par MSK:\par Lumbar spine:\par No tenderness to palpation.  No step-off, no deformity.\par \par NEURO EXAM:\par Sensation \par Left L2  -  1/2            \par Left L3  -  1/2\par Left L4  -  1/2\par Left L5  -  1/2\par Left S1  -  1/2\par \par Right L2  -  2/2            \par Right L3  -  2/2\par Right L4  -  2/2\par Right L5  -  2/2\par Right S1  -  2/2\par \par Motor: \par Left L2 (hip flexion)                            5/5                \par Left L3 (knee extension)                   5/5                \par Left L4 (ankle dorsiflexion)                 5/5                \par Left L5 (long toe extensor)                5/5                \par Left S1 (ankle plantar flexion)           5/5\par \par Right L2 (hip flexion)                            5/5                \par Right L3 (knee extension)                   5/5                \par Right L4 (ankle dorsiflexion)                 5/5                \par Right L5 (long toe extensor)                5/5                \par Right S1 (ankle plantar flexion)           5/5\par \par Reflexes: Normal and symmetric\par Negative clonus.  Down-going Babinski.  \par +SLR on left [de-identified] : I ordered radiographs to evaluate the patient's symptoms.\par \par Thoracic 2 view radiographs obtained in the office today shows no fracture or dislocation. s/p SCS placement\par \par Lumbar 4 view radiographs taken in the office today show no dislocation or fracture.  Lumbar spondylosis. s/p SCS placement.  L4-L5 spondylolisthesis with minimal movement on dynamic series.

## 2022-10-17 ENCOUNTER — APPOINTMENT (OUTPATIENT)
Dept: ORTHOPEDIC SURGERY | Facility: CLINIC | Age: 57
End: 2022-10-17

## 2022-10-17 PROCEDURE — 99024 POSTOP FOLLOW-UP VISIT: CPT

## 2022-10-17 NOTE — PHYSICAL EXAM
[de-identified] : AROM 0-140 degrees no medial joint tenderness neg Tanja and lachman\par QUads 4+/5\par

## 2022-10-17 NOTE — DISCUSSION/SUMMARY
[de-identified] : Doing well at 5.5 weeks PO meniscal root repair > has come off crutches a little early but doing quite well. \par Have asked her to stay on cane till week 8 and d/c brace next week. \par Will continue to phase 2 rehab. \par Avoid squatting or kneeling\par F/U 7 weeks\par \par

## 2022-10-17 NOTE — HISTORY OF PRESENT ILLNESS
[de-identified] : 5.5 weeks s/p right knee medial meniscus root repair. \par Patient doing well has been walking with only 1 crutch since week 4 but luckily with brace locked.

## 2022-10-19 ENCOUNTER — APPOINTMENT (OUTPATIENT)
Dept: ORTHOPEDIC SURGERY | Facility: CLINIC | Age: 57
End: 2022-10-19

## 2022-11-09 RX ORDER — MELOXICAM 15 MG/1
15 TABLET ORAL DAILY
Qty: 30 | Refills: 1 | Status: ACTIVE | COMMUNITY
Start: 2022-11-09 | End: 1900-01-01

## 2022-11-10 ENCOUNTER — APPOINTMENT (OUTPATIENT)
Dept: ORTHOPEDIC SURGERY | Facility: CLINIC | Age: 57
End: 2022-11-10

## 2022-11-10 DIAGNOSIS — S83.241D OTHER TEAR OF MEDIAL MENISCUS, CURRENT INJURY, RIGHT KNEE, SUBSEQUENT ENCOUNTER: ICD-10-CM

## 2022-11-10 DIAGNOSIS — M22.2X1 PATELLOFEMORAL DISORDERS, RIGHT KNEE: ICD-10-CM

## 2022-11-10 PROCEDURE — 99024 POSTOP FOLLOW-UP VISIT: CPT

## 2022-11-10 RX ORDER — METHYLPREDNISOLONE 4 MG/1
4 TABLET ORAL
Qty: 1 | Refills: 0 | Status: ACTIVE | COMMUNITY
Start: 2022-11-10 | End: 1900-01-01

## 2022-11-10 NOTE — HISTORY OF PRESENT ILLNESS
[de-identified] : 56 year old female presented with right knee pain, 9 weeks s/p right knee arthroscopy with medial meniscal repair. Pt states she is still in a lot of pain that started 2 weeks ago while doing PT. She describes pain level as a 8/10 and she has stopped physical therapy 2 weeks. No recent falls or trauma. She had stopped using the cane and has d/c-ed the brace.

## 2022-11-10 NOTE — DISCUSSION/SUMMARY
[de-identified] : 56 year old female returns 9 weeks s/p right knee arthroscopy with medial meniscal root repair. Pt states she pain that started 2 weeks ago while doing PT. She describes pain level as a 8/10, constant and has stopped physical therapy. She had stopped using the cane and has d/c-ed the brace. She has good ROM and strength and negative mandi and Sydnie which indicates her meniscal repair is healing well. 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 She also has some minimal  signs of PF syndrome. \par \par Plan\par Continue HEP, stop PT\par Medrol dose elaine  for neuroma pain\par F/u 6 weeks.\par I have told her the neuroma discomfort may continue for several months but I believe she will improve with time. \par If not we can consider selective nerve block or ablation.

## 2022-11-10 NOTE — DISCUSSION/SUMMARY
[de-identified] : 56 year old female returns 9 weeks s/p right knee arthroscopy with medial meniscal root repair. Pt states she pain that started 2 weeks ago while doing PT. She describes pain level as a 8/10, constant and has stopped physical therapy. She had stopped using the cane and has d/c-ed the brace. She has good ROM and strength and negative mandi and Sydnie which indicates her meniscal repair is healing well. 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 She also has some minimal  signs of PF syndrome. \par \par Plan\par Continue HEP, stop PT\par Medrol dose elaine  for neuroma pain\par F/u 6 weeks.\par I have told her the neuroma discomfort may continue for several months but I believe she will improve with time. \par If not we can consider selective nerve block or ablation.

## 2022-11-10 NOTE — REASON FOR VISIT
[Follow-Up Visit] : a follow-up visit for [Knee Pain] : knee pain [FreeTextEntry2] : right knee pain

## 2022-11-10 NOTE — HISTORY OF PRESENT ILLNESS
[de-identified] : 56 year old female presented with right knee pain, 9 weeks s/p right knee arthroscopy with medial meniscal repair. Pt states she is still in a lot of pain that started 2 weeks ago while doing PT. She describes pain level as a 8/10 and she has stopped physical therapy 2 weeks. No recent falls or trauma. She had stopped using the cane and has d/c-ed the brace.

## 2022-12-07 ENCOUNTER — APPOINTMENT (OUTPATIENT)
Dept: ORTHOPEDIC SURGERY | Facility: CLINIC | Age: 57
End: 2022-12-07

## 2022-12-22 RX ORDER — MELOXICAM 15 MG/1
15 TABLET ORAL DAILY
Qty: 30 | Refills: 1 | Status: ACTIVE | COMMUNITY
Start: 2022-12-22 | End: 1900-01-01

## 2023-01-05 ENCOUNTER — APPOINTMENT (OUTPATIENT)
Dept: ENDOCRINOLOGY | Facility: CLINIC | Age: 58
End: 2023-01-05

## 2023-01-05 ENCOUNTER — APPOINTMENT (OUTPATIENT)
Dept: ORTHOPEDIC SURGERY | Facility: CLINIC | Age: 58
End: 2023-01-05

## 2023-01-18 ENCOUNTER — APPOINTMENT (OUTPATIENT)
Dept: HEART AND VASCULAR | Facility: CLINIC | Age: 58
End: 2023-01-18

## 2023-01-18 ENCOUNTER — NON-APPOINTMENT (OUTPATIENT)
Age: 58
End: 2023-01-18

## 2023-01-19 ENCOUNTER — NON-APPOINTMENT (OUTPATIENT)
Age: 58
End: 2023-01-19

## 2023-01-19 ENCOUNTER — APPOINTMENT (OUTPATIENT)
Dept: ORTHOPEDIC SURGERY | Facility: CLINIC | Age: 58
End: 2023-01-19
Payer: MEDICAID

## 2023-01-19 DIAGNOSIS — G89.18 OTHER ACUTE POSTPROCEDURAL PAIN: ICD-10-CM

## 2023-01-19 DIAGNOSIS — M25.561 PAIN IN RIGHT KNEE: ICD-10-CM

## 2023-01-19 DIAGNOSIS — G89.29 PAIN IN RIGHT KNEE: ICD-10-CM

## 2023-01-19 DIAGNOSIS — Z98.890 OTHER SPECIFIED POSTPROCEDURAL STATES: ICD-10-CM

## 2023-01-19 PROCEDURE — 99441: CPT | Mod: GT

## 2023-01-19 RX ORDER — MELOXICAM 15 MG/1
15 TABLET ORAL DAILY
Qty: 30 | Refills: 1 | Status: ACTIVE | COMMUNITY
Start: 2023-01-19 | End: 1900-01-01

## 2023-01-30 ENCOUNTER — APPOINTMENT (OUTPATIENT)
Dept: ORTHOPEDIC SURGERY | Facility: CLINIC | Age: 58
End: 2023-01-30

## 2023-02-02 ENCOUNTER — TRANSCRIPTION ENCOUNTER (OUTPATIENT)
Age: 58
End: 2023-02-02

## 2023-02-02 NOTE — ASU PATIENT PROFILE, ADULT - CAREGIVER
11/16 pt called requesting returned called from medical staff in regards to eplerenone (INSPRA) 25 MG tablet  , please advise Yes

## 2023-02-02 NOTE — ASU PATIENT PROFILE, ADULT - NSICDXPASTMEDICALHX_GEN_ALL_CORE_FT
PAST MEDICAL HISTORY:  Anxiety     Anxiety and depression     Asthma     Chronic low back pain with bilateral sciatica     Deviated septum     Diabetes mellitus type 1    H/O carpal tunnel repair     H/O radiculopathy     High cholesterol     History of IBS     HTN (hypertension)     Hypertension     Hypocalcemia     Hypothyroid     Migraine     Spinal cord stimulator status     Stress incontinence     Thyroid cancer

## 2023-02-02 NOTE — ASU PATIENT PROFILE, ADULT - NS PREOP UNDERSTANDS INFO
bring photo id , insurance card, no food from midnight, water till 06:30 am , no jewelry, no valuables, no alcohol, no smoking, no drugs, wear loose comfort clothes. Have escort to pick  up/yes bring photo id , insurance card, no food from midnight, clear liquids till 10:00am , no jewelry, no valuables, no alcohol, no smoking, no drugs, wear loose comfort clothes. Have escort to pick  up/yes

## 2023-02-03 ENCOUNTER — TRANSCRIPTION ENCOUNTER (OUTPATIENT)
Age: 58
End: 2023-02-03

## 2023-02-03 ENCOUNTER — OUTPATIENT (OUTPATIENT)
Dept: OUTPATIENT SERVICES | Facility: HOSPITAL | Age: 58
LOS: 1 days | Discharge: HOME | End: 2023-02-03
Payer: MEDICAID

## 2023-02-03 VITALS
TEMPERATURE: 98 F | HEART RATE: 86 BPM | RESPIRATION RATE: 18 BRPM | SYSTOLIC BLOOD PRESSURE: 134 MMHG | DIASTOLIC BLOOD PRESSURE: 63 MMHG | OXYGEN SATURATION: 97 %

## 2023-02-03 VITALS
OXYGEN SATURATION: 98 % | WEIGHT: 205.69 LBS | DIASTOLIC BLOOD PRESSURE: 62 MMHG | HEIGHT: 63 IN | HEART RATE: 77 BPM | RESPIRATION RATE: 15 BRPM | SYSTOLIC BLOOD PRESSURE: 130 MMHG | TEMPERATURE: 98 F

## 2023-02-03 DIAGNOSIS — Z98.890 OTHER SPECIFIED POSTPROCEDURAL STATES: Chronic | ICD-10-CM

## 2023-02-03 DIAGNOSIS — Z98.1 ARTHRODESIS STATUS: Chronic | ICD-10-CM

## 2023-02-03 DIAGNOSIS — E89.0 POSTPROCEDURAL HYPOTHYROIDISM: Chronic | ICD-10-CM

## 2023-02-03 LAB — GLUCOSE BLDC GLUCOMTR-MCNC: 96 MG/DL — SIGNIFICANT CHANGE UP (ref 70–99)

## 2023-02-03 PROCEDURE — 88304 TISSUE EXAM BY PATHOLOGIST: CPT | Mod: 26

## 2023-02-03 DEVICE — KIT FREELINK REMOTE CONTROL BLE: Type: IMPLANTABLE DEVICE | Status: FUNCTIONAL

## 2023-02-03 DEVICE — KIT PULSE GENERATOR WAVEWRITER ALPHA: Type: IMPLANTABLE DEVICE | Status: FUNCTIONAL

## 2023-02-03 DEVICE — IMPLANTABLE DEVICE: Type: IMPLANTABLE DEVICE | Status: FUNCTIONAL

## 2023-02-03 DEVICE — ANCHOR LEAD CLIK X 4CM: Type: IMPLANTABLE DEVICE | Status: FUNCTIONAL

## 2023-02-03 RX ORDER — LIRAGLUTIDE 6 MG/ML
0 INJECTION SUBCUTANEOUS
Qty: 0 | Refills: 0 | DISCHARGE

## 2023-02-03 RX ORDER — CHLORHEXIDINE GLUCONATE 213 G/1000ML
1 SOLUTION TOPICAL ONCE
Refills: 0 | Status: COMPLETED | OUTPATIENT
Start: 2023-02-03 | End: 2023-02-03

## 2023-02-03 RX ORDER — METOPROLOL TARTRATE 50 MG
1 TABLET ORAL
Qty: 0 | Refills: 0 | DISCHARGE

## 2023-02-03 RX ORDER — RIMEGEPANT SULFATE 75 MG/75MG
1 TABLET, ORALLY DISINTEGRATING ORAL
Qty: 0 | Refills: 0 | DISCHARGE

## 2023-02-03 RX ORDER — CALCITRIOL 0.5 UG/1
1 CAPSULE ORAL
Qty: 0 | Refills: 0 | DISCHARGE

## 2023-02-03 RX ORDER — CLONAZEPAM 1 MG
1 TABLET ORAL
Qty: 0 | Refills: 0 | DISCHARGE

## 2023-02-03 RX ORDER — ACETAMINOPHEN 500 MG
650 TABLET ORAL ONCE
Refills: 0 | Status: DISCONTINUED | OUTPATIENT
Start: 2023-02-03 | End: 2023-02-03

## 2023-02-03 RX ORDER — ALBUTEROL 90 UG/1
2 AEROSOL, METERED ORAL
Qty: 0 | Refills: 0 | DISCHARGE

## 2023-02-03 RX ORDER — ONDANSETRON 8 MG/1
4 TABLET, FILM COATED ORAL ONCE
Refills: 0 | Status: COMPLETED | OUTPATIENT
Start: 2023-02-03 | End: 2023-02-03

## 2023-02-03 RX ORDER — TRAMADOL HYDROCHLORIDE 50 MG/1
0 TABLET ORAL
Qty: 0 | Refills: 0 | DISCHARGE

## 2023-02-03 RX ORDER — LEVOTHYROXINE SODIUM 125 MCG
1 TABLET ORAL
Qty: 0 | Refills: 0 | DISCHARGE

## 2023-02-03 RX ORDER — FENTANYL CITRATE 50 UG/ML
50 INJECTION INTRAVENOUS ONCE
Refills: 0 | Status: DISCONTINUED | OUTPATIENT
Start: 2023-02-03 | End: 2023-02-03

## 2023-02-03 RX ORDER — ESCITALOPRAM OXALATE 10 MG/1
30 TABLET, FILM COATED ORAL
Qty: 0 | Refills: 0 | DISCHARGE

## 2023-02-03 RX ORDER — SODIUM CHLORIDE 9 MG/ML
1000 INJECTION, SOLUTION INTRAVENOUS
Refills: 0 | Status: DISCONTINUED | OUTPATIENT
Start: 2023-02-03 | End: 2023-02-03

## 2023-02-03 RX ORDER — ARIPIPRAZOLE 15 MG/1
1 TABLET ORAL
Qty: 0 | Refills: 0 | DISCHARGE

## 2023-02-03 RX ORDER — SUMATRIPTAN SUCCINATE 4 MG/.5ML
1 INJECTION, SOLUTION SUBCUTANEOUS
Qty: 0 | Refills: 0 | DISCHARGE

## 2023-02-03 RX ADMIN — CHLORHEXIDINE GLUCONATE 1 APPLICATION(S): 213 SOLUTION TOPICAL at 11:51

## 2023-02-03 RX ADMIN — ONDANSETRON 4 MILLIGRAM(S): 8 TABLET, FILM COATED ORAL at 18:23

## 2023-02-03 RX ADMIN — FENTANYL CITRATE 50 MICROGRAM(S): 50 INJECTION INTRAVENOUS at 18:32

## 2023-02-03 RX ADMIN — SODIUM CHLORIDE 100 MILLILITER(S): 9 INJECTION, SOLUTION INTRAVENOUS at 18:15

## 2023-02-03 RX ADMIN — FENTANYL CITRATE 50 MICROGRAM(S): 50 INJECTION INTRAVENOUS at 18:17

## 2023-02-03 NOTE — PACU DISCHARGE NOTE - NSCLINEINSERTRD_GEN_ALL_CORE
----- Message from Beata Dowd sent at 10/31/2019 11:09 AM CDT -----  Contact:  izzy/wife [phone] or [phone]  Pt states pharmacy still don't have his medication that goes with his synthroid together states its 0.5mg please call back to discuss   
Pt need Rx for 0.50 mcg add on her 200 mcg dose for    thyroid.     Please advise.   
No

## 2023-02-09 PROBLEM — E11.9 TYPE 2 DIABETES MELLITUS WITHOUT COMPLICATIONS: Chronic | Status: ACTIVE | Noted: 2022-04-20

## 2023-02-10 ENCOUNTER — APPOINTMENT (OUTPATIENT)
Dept: HEART AND VASCULAR | Facility: CLINIC | Age: 58
End: 2023-02-10

## 2023-02-14 LAB — SURGICAL PATHOLOGY STUDY: SIGNIFICANT CHANGE UP

## 2023-02-22 ENCOUNTER — APPOINTMENT (OUTPATIENT)
Dept: HEART AND VASCULAR | Facility: CLINIC | Age: 58
End: 2023-02-22

## 2023-02-27 ENCOUNTER — APPOINTMENT (OUTPATIENT)
Dept: ORTHOPEDIC SURGERY | Facility: CLINIC | Age: 58
End: 2023-02-27

## 2023-02-27 ENCOUNTER — APPOINTMENT (OUTPATIENT)
Dept: PHYSICAL MEDICINE AND REHAB | Facility: CLINIC | Age: 58
End: 2023-02-27

## 2023-03-01 ENCOUNTER — APPOINTMENT (OUTPATIENT)
Dept: HEART AND VASCULAR | Facility: CLINIC | Age: 58
End: 2023-03-01

## 2023-03-01 ENCOUNTER — APPOINTMENT (OUTPATIENT)
Dept: ORTHOPEDIC SURGERY | Facility: CLINIC | Age: 58
End: 2023-03-01

## 2023-03-07 ENCOUNTER — APPOINTMENT (OUTPATIENT)
Dept: PHYSICAL MEDICINE AND REHAB | Facility: CLINIC | Age: 58
End: 2023-03-07

## 2023-03-10 ENCOUNTER — RX RENEWAL (OUTPATIENT)
Age: 58
End: 2023-03-10

## 2023-03-10 RX ORDER — LEVOTHYROXINE SODIUM 0.2 MG/1
200 TABLET ORAL
Qty: 30 | Refills: 1 | Status: ACTIVE | COMMUNITY
Start: 2023-03-10 | End: 1900-01-01

## 2023-03-20 ENCOUNTER — APPOINTMENT (OUTPATIENT)
Dept: ORTHOPEDIC SURGERY | Facility: CLINIC | Age: 58
End: 2023-03-20

## 2023-03-29 ENCOUNTER — APPOINTMENT (OUTPATIENT)
Dept: HEART AND VASCULAR | Facility: CLINIC | Age: 58
End: 2023-03-29

## 2023-04-03 ENCOUNTER — APPOINTMENT (OUTPATIENT)
Dept: ORTHOPEDIC SURGERY | Facility: CLINIC | Age: 58
End: 2023-04-03
Payer: MEDICAID

## 2023-04-03 VITALS — HEIGHT: 63 IN | BODY MASS INDEX: 36.68 KG/M2 | WEIGHT: 207 LBS

## 2023-04-03 PROCEDURE — 99214 OFFICE O/P EST MOD 30 MIN: CPT

## 2023-04-03 PROCEDURE — 73562 X-RAY EXAM OF KNEE 3: CPT | Mod: RT

## 2023-04-03 RX ORDER — HYALURONATE SODIUM 30 MG/2 ML
30 SYRINGE (ML) INTRAARTICULAR
Qty: 3 | Refills: 0 | Status: ACTIVE | OUTPATIENT
Start: 2023-04-03

## 2023-04-03 NOTE — HISTORY OF PRESENT ILLNESS
[de-identified] : Initial Visit for Right Knee \par Reason: MAY 2022- Denies Injury /Trauma - Went to PCP - Dominique with Medical Meniscus Root Tear\par Dr. DOS SANTOS ([facility]) - Surgery to the Right Knee on 09/2022\par Currently has a Lower back stimulator \par Prior Studies: PT (NOT HELPFUL- ONLY WORSENED) \par MRI- [facility] 06/27/2022\par Aggravating Factors: Prolonged Walking/Standing\par Symptoms: Dull- Consistent when sitting, Swelling \par Alleviating Factors: Minor Improvement with Knee Brace\par Allergies: Contrast

## 2023-04-03 NOTE — PHYSICAL EXAM
[de-identified] : Right knee\par \par Constitutional: \par The patient is healthy-appearing and in no apparent distress. \par \par Gait:\par The patient ambulates with a normal gait and no limp.\par \par Cardiovascular System: \par The capillary refill is less than 2 seconds. \par \par Skin: \par There are no skin abnormalities other than well-healed arthroscopy scars.\par \par Right Knee:\par  \par Bony Palpation: \par There is tenderness of the medial joint line. \par There is tenderness of the lateral joint line.\par There is tenderness of the medial femoral chondyle.\par There is tenderness of the lateral femoral chondyle.\par There is no tenderness of the tibial tubercle.\par There is no tenderness of the superior patella.\par There is no tenderness of the inferior patella.\par There is tenderness of the medial patellar facet.\par There is tenderness of the lateral patellar facet.\par \par Soft Tissue Palpation: \par There is no tenderness of the MCL.\par There is tenderness of the medial retinaculum.\par There is tenderness of the lateral retinaculum.\par There is no tenderness of the LCL.\par There is no tenderness of the quadriceps tendon.\par There is no tenderness of the patella tendon.\par There is no tenderness of the ITB.\par There is no tenderness of the pes anserine.\par \par Active Range of Motion: \par The range of motion at the knee actively and passively is 0 - 125 with pain at end range. \par \par Special Tests: \par There is a negative Apley.\par There is a negative Steinmanns. \par There is a negative Lachman and Anterior Drawer.\par There is a negative Posterior Drawer.  \par There is no varus or valgus laxity.\par \par Strength: \par There is 5/5 hip flexion and 5/5 knee flexion and extension.  \par \par Psychiatric: \par The patient demonstrates a normal mood and affect and is active and alert [de-identified] : Given patient's reported history and physical examination, x-ray evaluation ( as listed below ) was ordered and performed to aid in diagnosis and treatment of the patient.\par X-ray right knee.  There is moderate medial and patellofemoral arthritis with mild varus alignment.  There is a button on the tibia consistent with her prior meniscal repair

## 2023-04-03 NOTE — ASSESSMENT
[FreeTextEntry1] : Discussed at length with patient underlying arthritis as well as intraoperative pictures she provided.  Her due to the patient given her persistent symptoms and on arthritis treatment options and at this time patient elects home exercises physical therapy as well as consideration to Visco supplementation.  Discussed at length given her arthritis she may ultimately require a total knee replacement\par

## 2023-04-07 RX ORDER — HYALURONATE SODIUM 10 MG/ML
25 SYRINGE (ML) INTRAARTICULAR
Qty: 3 | Refills: 0 | Status: ACTIVE | OUTPATIENT
Start: 2023-04-07

## 2023-04-12 ENCOUNTER — APPOINTMENT (OUTPATIENT)
Dept: HEART AND VASCULAR | Facility: CLINIC | Age: 58
End: 2023-04-12

## 2023-04-20 ENCOUNTER — APPOINTMENT (OUTPATIENT)
Dept: ENDOCRINOLOGY | Facility: CLINIC | Age: 58
End: 2023-04-20

## 2023-05-01 ENCOUNTER — RX RENEWAL (OUTPATIENT)
Age: 58
End: 2023-05-01

## 2023-05-03 ENCOUNTER — APPOINTMENT (OUTPATIENT)
Dept: ORTHOPEDIC SURGERY | Facility: CLINIC | Age: 58
End: 2023-05-03
Payer: MEDICAID

## 2023-05-03 PROCEDURE — 99213 OFFICE O/P EST LOW 20 MIN: CPT | Mod: 25

## 2023-05-03 PROCEDURE — 20611 DRAIN/INJ JOINT/BURSA W/US: CPT | Mod: RT

## 2023-05-03 NOTE — HISTORY OF PRESENT ILLNESS
[de-identified] : Patient is an established patient presenting for evaluation and further treatment discussions in regards to right knee arthritis

## 2023-05-03 NOTE — PHYSICAL EXAM
[de-identified] : Right knee\par \par Constitutional: \par The patient is healthy-appearing and in no apparent distress. \par \par Gait:\par The patient ambulates with a normal gait and no limp.\par \par Cardiovascular System: \par The capillary refill is less than 2 seconds. \par \par Skin: \par There are no skin abnormalities other than well-healed arthroscopy scars.\par \par Right Knee:\par  \par Bony Palpation: \par There is tenderness of the medial joint line. \par There is tenderness of the lateral joint line.\par There is tenderness of the medial femoral chondyle.\par There is tenderness of the lateral femoral chondyle.\par There is no tenderness of the tibial tubercle.\par There is no tenderness of the superior patella.\par There is no tenderness of the inferior patella.\par There is tenderness of the medial patellar facet.\par There is tenderness of the lateral patellar facet.\par \par Soft Tissue Palpation: \par There is no tenderness of the MCL.\par There is tenderness of the medial retinaculum.\par There is tenderness of the lateral retinaculum.\par There is no tenderness of the LCL.\par There is no tenderness of the quadriceps tendon.\par There is no tenderness of the patella tendon.\par There is no tenderness of the ITB.\par There is no tenderness of the pes anserine.\par \par Active Range of Motion: \par The range of motion at the knee actively and passively is 0 - 125 with pain at end range. \par \par Special Tests: \par There is a negative Apley.\par There is a negative Steinmanns. \par There is a negative Lachman and Anterior Drawer.\par There is a negative Posterior Drawer.  \par There is no varus or valgus laxity.\par \par Strength: \par There is 5/5 hip flexion and 5/5 knee flexion and extension.  \par \par Psychiatric: \par The patient demonstrates a normal mood and affect and is active and alert

## 2023-05-03 NOTE — ASSESSMENT
[FreeTextEntry1] : Discussed at length with patient prior imaging and exam and treatment options and at this time patient elects viscosupplementation to her right knee

## 2023-05-03 NOTE — PROCEDURE
[de-identified] : After discussion of the risks and benefits, the patient elects Visco-3 injections to the knees.  Confirmed that the patient does not have history of prior adverse reactions, active infections, or relevant allergies.  There was no effusion, erythema, or warmth, and the skin was clear.\par The skin was prepped in the usual sterile manner, ethyl chloride spray was used as a topical anesthetic.  A needle was inserted \par UTILIZING ULTRASOUND GUIDANCE TO LOCALIZE THE NEEDLE IN THE KNEE JOINT\par into the RIGHT KNEE via an anterolateral approach.  Viscosupplement was then slowly injected. The injection was completed without complication and a bandage was applied.\par The patient tolerated the procedure well and was given post-injection instructions: no exercise x 48 hours, cold packs and analgesics prn.\par

## 2023-05-10 ENCOUNTER — APPOINTMENT (OUTPATIENT)
Dept: ORTHOPEDIC SURGERY | Facility: CLINIC | Age: 58
End: 2023-05-10
Payer: MEDICAID

## 2023-05-10 PROCEDURE — 99213 OFFICE O/P EST LOW 20 MIN: CPT | Mod: 25

## 2023-05-10 PROCEDURE — 20611 DRAIN/INJ JOINT/BURSA W/US: CPT | Mod: RT

## 2023-05-10 NOTE — ASSESSMENT
[FreeTextEntry1] : Discussed at length with patient exam history and imaging at this time patient elects viscosupplementation for the right knee

## 2023-05-10 NOTE — PROCEDURE
[de-identified] : After discussion of the risks and benefits, the patient elects Visco-3 injections to the knees.  Confirmed that the patient does not have history of prior adverse reactions, active infections, or relevant allergies.  There was no effusion, erythema, or warmth, and the skin was clear.\par The skin was prepped in the usual sterile manner, ethyl chloride spray was used as a topical anesthetic.  A needle was inserted \par UTILIZING ULTRASOUND GUIDANCE TO LOCALIZE THE NEEDLE IN THE KNEE JOINT\par into the RIGHT KNEE via an anterolateral approach.  Viscosupplement was then slowly injected. The injection was completed without complication and a bandage was applied.\par The patient tolerated the procedure well and was given post-injection instructions: no exercise x 48 hours, cold packs and analgesics prn.\par

## 2023-05-10 NOTE — HISTORY OF PRESENT ILLNESS
[de-identified] : Patient is an established patient presenting for evaluation and further treatment discussions in regards to right knee arthritis

## 2023-05-17 ENCOUNTER — APPOINTMENT (OUTPATIENT)
Dept: ORTHOPEDIC SURGERY | Facility: CLINIC | Age: 58
End: 2023-05-17
Payer: MEDICAID

## 2023-05-17 DIAGNOSIS — M17.11 UNILATERAL PRIMARY OSTEOARTHRITIS, RIGHT KNEE: ICD-10-CM

## 2023-05-17 PROCEDURE — 20611 DRAIN/INJ JOINT/BURSA W/US: CPT | Mod: RT

## 2023-05-17 PROCEDURE — 99212 OFFICE O/P EST SF 10 MIN: CPT | Mod: 25

## 2023-05-17 RX ORDER — CELECOXIB 100 MG/1
100 CAPSULE ORAL TWICE DAILY
Qty: 60 | Refills: 2 | Status: ACTIVE | COMMUNITY
Start: 2023-05-17 | End: 1900-01-01

## 2023-05-17 NOTE — PHYSICAL EXAM
[de-identified] : Right knee\par \par Constitutional: \par The patient is healthy-appearing and in no apparent distress. \par \par Gait:\par The patient ambulates with a normal gait and no limp.\par \par Cardiovascular System: \par The capillary refill is less than 2 seconds. \par \par Skin: \par There are no skin abnormalities other than well-healed arthroscopy scars.\par \par Right Knee:\par  \par Bony Palpation: \par There is tenderness of the medial joint line. \par There is tenderness of the lateral joint line.\par There is tenderness of the medial femoral chondyle.\par There is tenderness of the lateral femoral chondyle.\par There is no tenderness of the tibial tubercle.\par There is no tenderness of the superior patella.\par There is no tenderness of the inferior patella.\par There is tenderness of the medial patellar facet.\par There is tenderness of the lateral patellar facet.\par \par Soft Tissue Palpation: \par There is no tenderness of the MCL.\par There is tenderness of the medial retinaculum.\par There is tenderness of the lateral retinaculum.\par There is no tenderness of the LCL.\par There is no tenderness of the quadriceps tendon.\par There is no tenderness of the patella tendon.\par There is no tenderness of the ITB.\par There is no tenderness of the pes anserine.\par \par Active Range of Motion: \par The range of motion at the knee actively and passively is 0 - 125 with pain at end range. \par \par Special Tests: \par There is a negative Apley.\par There is a negative Steinmanns. \par There is a negative Lachman and Anterior Drawer.\par There is a negative Posterior Drawer.  \par There is no varus or valgus laxity.\par \par Strength: \par There is 5/5 hip flexion and 5/5 knee flexion and extension.  \par \par Psychiatric: \par The patient demonstrates a normal mood and affect and is active and alert

## 2023-05-17 NOTE — HISTORY OF PRESENT ILLNESS
[de-identified] : Patient is an established patient presenting for evaluation and further treatment discussions in regards to right knee arthritis

## 2023-06-26 NOTE — REVIEW OF SYSTEMS
[Weight Loss (___ Lbs)] : no recent weight loss [SOB] : no shortness of breath [Lower Ext Edema] : no extremity edema [Palpitations] : no palpitations

## 2023-06-26 NOTE — HISTORY OF PRESENT ILLNESS
[FreeTextEntry1] : Vibha Gamino is a 58 yo woman with a pmhx of hypothyroidism, anxiety, diet controlled DM, HLD, and palpitations. She has spine issues dating back to 2015 and s/p numerous surgeries. January 2022 she had SCS placement with good results  after adjustments and pain has improved and swelling has resolved.  9/2022 S/P right knee medial meniscus root repair, debridement and chondroplasty.She follows up with ortho for knee pain and injections for pain relief. She will follow up with a physiatrist for further evaluation and treatment. She was last seen in the office (6/2022) with c/o palpitations. Last echo was unremarkable.

## 2023-06-28 ENCOUNTER — APPOINTMENT (OUTPATIENT)
Dept: HEART AND VASCULAR | Facility: CLINIC | Age: 58
End: 2023-06-28

## 2023-07-17 ENCOUNTER — APPOINTMENT (OUTPATIENT)
Dept: GASTROENTEROLOGY | Facility: CLINIC | Age: 58
End: 2023-07-17
Payer: MEDICAID

## 2023-07-17 VITALS
HEART RATE: 81 BPM | WEIGHT: 207 LBS | BODY MASS INDEX: 36.68 KG/M2 | TEMPERATURE: 97.2 F | OXYGEN SATURATION: 97 % | HEIGHT: 63 IN | DIASTOLIC BLOOD PRESSURE: 83 MMHG | SYSTOLIC BLOOD PRESSURE: 139 MMHG

## 2023-07-17 DIAGNOSIS — R10.13 EPIGASTRIC PAIN: ICD-10-CM

## 2023-07-17 PROCEDURE — 99204 OFFICE O/P NEW MOD 45 MIN: CPT | Mod: 25

## 2023-07-17 RX ORDER — SODIUM SULFATE, POTASSIUM SULFATE AND MAGNESIUM SULFATE 1.6; 3.13; 17.5 G/177ML; G/177ML; G/177ML
17.5-3.13-1.6 SOLUTION ORAL
Qty: 2 | Refills: 0 | Status: ACTIVE | COMMUNITY
Start: 2023-07-17 | End: 1900-01-01

## 2023-07-17 RX ORDER — ONDANSETRON 4 MG/1
4 TABLET, ORALLY DISINTEGRATING ORAL
Qty: 28 | Refills: 0 | Status: ACTIVE | COMMUNITY
Start: 2023-07-17 | End: 1900-01-01

## 2023-07-18 LAB
ALBUMIN SERPL ELPH-MCNC: 4.6 G/DL
ALP BLD-CCNC: 121 U/L
ALT SERPL-CCNC: 13 U/L
ANION GAP SERPL CALC-SCNC: 13 MMOL/L
AST SERPL-CCNC: 19 U/L
BASOPHILS # BLD AUTO: 0.05 K/UL
BASOPHILS NFR BLD AUTO: 0.7 %
BILIRUB SERPL-MCNC: 0.2 MG/DL
BUN SERPL-MCNC: 16 MG/DL
CALCIUM SERPL-MCNC: 8.1 MG/DL
CHLORIDE SERPL-SCNC: 101 MMOL/L
CO2 SERPL-SCNC: 27 MMOL/L
CREAT SERPL-MCNC: 0.84 MG/DL
EGFR: 82 ML/MIN/1.73M2
EOSINOPHIL # BLD AUTO: 0.23 K/UL
EOSINOPHIL NFR BLD AUTO: 3.1 %
GLUCOSE SERPL-MCNC: 89 MG/DL
HCT VFR BLD CALC: 36.8 %
HGB BLD-MCNC: 11.3 G/DL
IMM GRANULOCYTES NFR BLD AUTO: 0.3 %
LYMPHOCYTES # BLD AUTO: 1.58 K/UL
LYMPHOCYTES NFR BLD AUTO: 21.5 %
MAN DIFF?: NORMAL
MCHC RBC-ENTMCNC: 30.5 PG
MCHC RBC-ENTMCNC: 30.7 GM/DL
MCV RBC AUTO: 99.2 FL
MONOCYTES # BLD AUTO: 0.58 K/UL
MONOCYTES NFR BLD AUTO: 7.9 %
NEUTROPHILS # BLD AUTO: 4.9 K/UL
NEUTROPHILS NFR BLD AUTO: 66.5 %
PLATELET # BLD AUTO: 332 K/UL
POTASSIUM SERPL-SCNC: 4.6 MMOL/L
PROT SERPL-MCNC: 7 G/DL
RBC # BLD: 3.71 M/UL
RBC # FLD: 13.8 %
SODIUM SERPL-SCNC: 141 MMOL/L
WBC # FLD AUTO: 7.36 K/UL

## 2023-07-18 NOTE — ASSESSMENT
[FreeTextEntry1] : 56 yo F PMH hypothyroidism, diet controlled DM, chronic back pain who presented for evaluation of abdominal pain, and change in bowel habits x 1 mo. \par \par #Acute diffuse abdominal pain\par - Presenting symptoms are concerning for NSAID induced peptic ulcer disease vs constipation\par - Have asked patient to discontinue NSAIDs for now\par - Continue current once daily omeprazole. Advised to take in the morning 30 min before breakfast/coffee (has been taking it after drinking coffee)\par - diet and lifetstyle modifications discussed (avoid caffeine, chocolate, spicy foods, greasy foods)\par - KUB to evaluate stool burden\par - Will plan for EGD with colonoscopy. Would like to attempt prep with suprep as patient has increasing nausea with high volume preps\par - reviewed r/b/a/i of the procedures including but not limited to bleeding, missing an abnormal finding, pain, perforation, infection, missed polyp, cardio-respiratory risks of anesthesia, sore throat, incomplete colon examination, etc\par - patient agrees and gives verbal consent.  Written consent to be obtained at time of procedure\par - reviewed bowel preparation instructions in detail\par - needs adult escort the day of the procedure\par - may need COVID-19 testing within 3 days of procedure\par - preprocedure labs today: CMP and CBC completed and reviewed with the patient\par - Ondansetron for nausea\par \par #Suspected hepatic hemangioma\par - Noted on ultrasound report patient brought to clinic today. Scanned into chart. \par - Will monitor symptomatically. Discussed with patient the usual benign nature of this finding. \par \par Hypothyroidism\par - needs to return to endocrinologist for f/u of TSH (previously 75), which may be contributing to symptoms\par \par Anemia\par - history of anemia, repeat CBC with improved Hgb, though still with anemia\par - consider iron studies next set of labs\par - plan for EGD/Colonoscopy as noted above\par \par Alk phos - mild elevation to 121 ()\par - consider repeating in 3 mos, check GGT\par \par Follow up post-procedure

## 2023-07-18 NOTE — HISTORY OF PRESENT ILLNESS
[FreeTextEntry1] : This is a 57 year old female with a past medical history of hypothyroidism, diet controlled diabetes mellitus type 2, and chronic back pain who presented to the clinic today with abdominal pain and frequent bowel movements for one month. Patient describes having 5-10x bowel movements every morning and states they are formed without hematochezia. Describes not typically having bowel movements through out the day. States she normally has 2 bowel movements in the morning and occasionally one at night. She also describes epigastric abdominal pain, cramping. The abdominal pain does not radiate and is not associated with obvious aggravating or alleviating factors. Denies fever at home. States that due to her ongoing back and knee pain, she uses numerous pain medications on a daily basis, including 4-6 tablets of NSAIDs. She was evaluated by another gastroenterologist as a virtual visit last month for the same complaints and initiated once daily omeprazole, which she states she takes after her morning meal. The medication has not changed her symptoms. She does describe having similar symptoms in 2021 for which an EGD was performed without obvious findings. She was instructed to perform dietary modifications and increase in physical activity after which she had improvement in symptoms for about a year. Her most recent colonoscopy was 10 years prior to this presentation. Has not had recent laboratory evaluation. Most recently received cardiac evaluation 6/28/23 for orthopedic surgical clearance.

## 2023-07-18 NOTE — PHYSICAL EXAM
[Normal] : heart rate was normal and rhythm regular, normal S1 and S2, no murmurs [Bowel Sounds] : normal bowel sounds [No Masses] : no abdominal mass palpated [RUQ] : RUQ [LUQ] : LUQ [RLQ] : RLQ [LLQ] : LLQ [Abdomen Soft] : soft [Ascites: ___] : no ascites [Rebound Tenderness] : no rebound tenderness [de-identified] : Diffuse abdominal tenderness to palpation

## 2023-07-27 ENCOUNTER — APPOINTMENT (OUTPATIENT)
Dept: HEPATOLOGY | Facility: CLINIC | Age: 58
End: 2023-07-27

## 2023-07-31 ENCOUNTER — TRANSCRIPTION ENCOUNTER (OUTPATIENT)
Age: 58
End: 2023-07-31

## 2023-08-23 RX ORDER — ONDANSETRON 4 MG/1
4 TABLET, ORALLY DISINTEGRATING ORAL EVERY 8 HOURS
Qty: 5 | Refills: 0 | Status: ACTIVE | COMMUNITY
Start: 2023-07-17 | End: 1900-01-01

## 2023-09-07 ENCOUNTER — APPOINTMENT (OUTPATIENT)
Dept: GASTROENTEROLOGY | Facility: AMBULATORY SURGERY CENTER | Age: 58
End: 2023-09-07
Payer: MEDICAID

## 2023-09-07 ENCOUNTER — RESULT REVIEW (OUTPATIENT)
Age: 58
End: 2023-09-07

## 2023-09-07 PROCEDURE — 45330 DIAGNOSTIC SIGMOIDOSCOPY: CPT

## 2023-09-07 PROCEDURE — 43239 EGD BIOPSY SINGLE/MULTIPLE: CPT

## 2023-09-07 RX ORDER — POLYETHYLENE GLYCOL 3350 AND ELECTROLYTES WITH LEMON FLAVOR 236; 22.74; 6.74; 5.86; 2.97 G/4L; G/4L; G/4L; G/4L; G/4L
236 POWDER, FOR SOLUTION ORAL
Qty: 1 | Refills: 0 | Status: ACTIVE | COMMUNITY
Start: 2023-09-07 | End: 1900-01-01

## 2023-09-12 ENCOUNTER — NON-APPOINTMENT (OUTPATIENT)
Age: 58
End: 2023-09-12

## 2023-09-18 ENCOUNTER — APPOINTMENT (OUTPATIENT)
Dept: HEPATOLOGY | Facility: CLINIC | Age: 58
End: 2023-09-18

## 2023-09-28 ENCOUNTER — APPOINTMENT (OUTPATIENT)
Dept: GASTROENTEROLOGY | Facility: AMBULATORY SURGERY CENTER | Age: 58
End: 2023-09-28

## 2023-12-04 ENCOUNTER — RX RENEWAL (OUTPATIENT)
Age: 58
End: 2023-12-04

## 2023-12-04 RX ORDER — MELOXICAM 15 MG/1
15 TABLET ORAL DAILY
Qty: 30 | Refills: 2 | Status: ACTIVE | COMMUNITY
Start: 2023-04-03 | End: 1900-01-01

## 2024-09-10 ENCOUNTER — APPOINTMENT (OUTPATIENT)
Dept: NEUROSURGERY | Facility: CLINIC | Age: 59
End: 2024-09-10

## 2024-09-10 VITALS
WEIGHT: 207 LBS | SYSTOLIC BLOOD PRESSURE: 124 MMHG | DIASTOLIC BLOOD PRESSURE: 75 MMHG | HEART RATE: 61 BPM | RESPIRATION RATE: 16 BRPM | OXYGEN SATURATION: 99 % | TEMPERATURE: 98.1 F | BODY MASS INDEX: 36.68 KG/M2 | HEIGHT: 63 IN

## 2024-09-10 DIAGNOSIS — G89.29 LUMBAGO WITH SCIATICA, LEFT SIDE: ICD-10-CM

## 2024-09-10 DIAGNOSIS — G89.29 DORSALGIA, UNSPECIFIED: ICD-10-CM

## 2024-09-10 DIAGNOSIS — M54.9 DORSALGIA, UNSPECIFIED: ICD-10-CM

## 2024-09-10 DIAGNOSIS — M54.41 LUMBAGO WITH SCIATICA, LEFT SIDE: ICD-10-CM

## 2024-09-10 DIAGNOSIS — M54.42 LUMBAGO WITH SCIATICA, LEFT SIDE: ICD-10-CM

## 2024-09-10 PROCEDURE — 99204 OFFICE O/P NEW MOD 45 MIN: CPT

## 2024-10-03 RX ORDER — DOCUSATE SODIUM 100 MG/1
100 CAPSULE ORAL
Qty: 14 | Refills: 0 | Status: COMPLETED | COMMUNITY
Start: 2024-10-03 | End: 2024-10-10

## 2024-10-03 RX ORDER — METHOCARBAMOL 500 MG/1
500 TABLET, FILM COATED ORAL 3 TIMES DAILY
Qty: 15 | Refills: 0 | Status: COMPLETED | COMMUNITY
Start: 2024-10-03 | End: 2024-10-08

## 2024-10-04 ENCOUNTER — APPOINTMENT (OUTPATIENT)
Age: 59
End: 2024-10-04

## 2024-10-04 PROCEDURE — 63661 REMOVE SPINE ELTRD PERQ ARAY: CPT

## 2024-10-04 PROCEDURE — 63661 REMOVE SPINE ELTRD PERQ ARAY: CPT | Mod: AS

## 2024-10-04 PROCEDURE — 63688 REV/RMV IMP SP NPG/R DTCH CN: CPT

## 2024-10-22 ENCOUNTER — APPOINTMENT (OUTPATIENT)
Dept: NEUROSURGERY | Facility: CLINIC | Age: 59
End: 2024-10-22

## 2024-11-19 ENCOUNTER — APPOINTMENT (OUTPATIENT)
Dept: NEUROSURGERY | Facility: CLINIC | Age: 59
End: 2024-11-19

## 2025-03-15 ENCOUNTER — APPOINTMENT (OUTPATIENT)
Dept: MRI IMAGING | Facility: CLINIC | Age: 60
End: 2025-03-15

## 2025-03-29 ENCOUNTER — APPOINTMENT (OUTPATIENT)
Dept: MRI IMAGING | Facility: CLINIC | Age: 60
End: 2025-03-29

## 2025-05-03 ENCOUNTER — APPOINTMENT (OUTPATIENT)
Dept: MRI IMAGING | Facility: CLINIC | Age: 60
End: 2025-05-03
